# Patient Record
Sex: MALE | Race: BLACK OR AFRICAN AMERICAN | NOT HISPANIC OR LATINO | Employment: OTHER | URBAN - METROPOLITAN AREA
[De-identification: names, ages, dates, MRNs, and addresses within clinical notes are randomized per-mention and may not be internally consistent; named-entity substitution may affect disease eponyms.]

---

## 2018-02-16 ENCOUNTER — APPOINTMENT (EMERGENCY)
Dept: RADIOLOGY | Facility: HOSPITAL | Age: 33
DRG: 175 | End: 2018-02-16
Payer: MEDICARE

## 2018-02-16 ENCOUNTER — HOSPITAL ENCOUNTER (INPATIENT)
Facility: HOSPITAL | Age: 33
LOS: 1 days | Discharge: HOME | DRG: 175 | End: 2018-02-18
Attending: EMERGENCY MEDICINE | Admitting: INTERNAL MEDICINE
Payer: MEDICARE

## 2018-02-16 DIAGNOSIS — I26.09 OTHER PULMONARY EMBOLISM WITH ACUTE COR PULMONALE, UNSPECIFIED CHRONICITY (HCC): ICD-10-CM

## 2018-02-16 DIAGNOSIS — I26.99 BILATERAL PULMONARY EMBOLISM (HCC): Primary | ICD-10-CM

## 2018-02-16 PROBLEM — F12.10 MARIJUANA ABUSE: Status: ACTIVE | Noted: 2018-02-16

## 2018-02-16 PROBLEM — R07.9 CHEST PAIN: Status: ACTIVE | Noted: 2018-02-16

## 2018-02-16 PROBLEM — R06.02 SOB (SHORTNESS OF BREATH): Status: ACTIVE | Noted: 2018-02-16

## 2018-02-16 PROBLEM — I21.A1 NON-ST ELEVATION MYOCARDIAL INFARCTION (NSTEMI), TYPE 2: Status: ACTIVE | Noted: 2018-02-16

## 2018-02-16 LAB
ALBUMIN SERPL BCP-MCNC: 3.4 G/DL (ref 3.5–5)
ALP SERPL-CCNC: 65 U/L (ref 46–116)
ALT SERPL W P-5'-P-CCNC: 15 U/L (ref 12–78)
ANION GAP SERPL CALCULATED.3IONS-SCNC: 9 MMOL/L (ref 4–13)
APTT PPP: 24 SECONDS (ref 23–35)
AST SERPL W P-5'-P-CCNC: 9 U/L (ref 5–45)
ATRIAL RATE: 97 BPM
BASOPHILS # BLD AUTO: 0.03 THOUSANDS/ΜL (ref 0–0.1)
BASOPHILS NFR BLD AUTO: 0 % (ref 0–1)
BILIRUB SERPL-MCNC: 0.84 MG/DL (ref 0.2–1)
BUN SERPL-MCNC: 6 MG/DL (ref 5–25)
CALCIUM SERPL-MCNC: 8.7 MG/DL (ref 8.3–10.1)
CHLORIDE SERPL-SCNC: 106 MMOL/L (ref 100–108)
CO2 SERPL-SCNC: 27 MMOL/L (ref 21–32)
CREAT SERPL-MCNC: 1.21 MG/DL (ref 0.6–1.3)
EOSINOPHIL # BLD AUTO: 0.29 THOUSAND/ΜL (ref 0–0.61)
EOSINOPHIL NFR BLD AUTO: 4 % (ref 0–6)
ERYTHROCYTE [DISTWIDTH] IN BLOOD BY AUTOMATED COUNT: 14.7 % (ref 11.6–15.1)
GFR SERPL CREATININE-BSD FRML MDRD: 78 ML/MIN/1.73SQ M
GLUCOSE SERPL-MCNC: 105 MG/DL (ref 65–140)
HCT VFR BLD AUTO: 37.6 % (ref 36.5–49.3)
HGB BLD-MCNC: 12.5 G/DL (ref 12–17)
INR PPP: 1.11 (ref 0.86–1.16)
LYMPHOCYTES # BLD AUTO: 2.16 THOUSANDS/ΜL (ref 0.6–4.47)
LYMPHOCYTES NFR BLD AUTO: 28 % (ref 14–44)
MCH RBC QN AUTO: 25.9 PG (ref 26.8–34.3)
MCHC RBC AUTO-ENTMCNC: 33.2 G/DL (ref 31.4–37.4)
MCV RBC AUTO: 78 FL (ref 82–98)
MONOCYTES # BLD AUTO: 0.97 THOUSAND/ΜL (ref 0.17–1.22)
MONOCYTES NFR BLD AUTO: 13 % (ref 4–12)
NEUTROPHILS # BLD AUTO: 4.12 THOUSANDS/ΜL (ref 1.85–7.62)
NEUTS SEG NFR BLD AUTO: 55 % (ref 43–75)
NRBC BLD AUTO-RTO: 0 /100 WBCS
NT-PROBNP SERPL-MCNC: 2093 PG/ML
P AXIS: 76 DEGREES
PLATELET # BLD AUTO: 196 THOUSANDS/UL (ref 149–390)
PMV BLD AUTO: 9.5 FL (ref 8.9–12.7)
POTASSIUM SERPL-SCNC: 3.4 MMOL/L (ref 3.5–5.3)
PR INTERVAL: 164 MS
PROT SERPL-MCNC: 7.8 G/DL (ref 6.4–8.2)
PROTHROMBIN TIME: 14.3 SECONDS (ref 12.1–14.4)
QRS AXIS: 74 DEGREES
QRSD INTERVAL: 90 MS
QT INTERVAL: 352 MS
QTC INTERVAL: 447 MS
RBC # BLD AUTO: 4.83 MILLION/UL (ref 3.88–5.62)
SODIUM SERPL-SCNC: 142 MMOL/L (ref 136–145)
T WAVE AXIS: 30 DEGREES
TROPONIN I SERPL-MCNC: 0.19 NG/ML
VENTRICULAR RATE: 97 BPM
WBC # BLD AUTO: 7.6 THOUSAND/UL (ref 4.31–10.16)

## 2018-02-16 PROCEDURE — 93005 ELECTROCARDIOGRAM TRACING: CPT

## 2018-02-16 PROCEDURE — 71275 CT ANGIOGRAPHY CHEST: CPT

## 2018-02-16 PROCEDURE — 93010 ELECTROCARDIOGRAM REPORT: CPT | Performed by: INTERNAL MEDICINE

## 2018-02-16 PROCEDURE — 83880 ASSAY OF NATRIURETIC PEPTIDE: CPT | Performed by: EMERGENCY MEDICINE

## 2018-02-16 PROCEDURE — 85610 PROTHROMBIN TIME: CPT | Performed by: EMERGENCY MEDICINE

## 2018-02-16 PROCEDURE — 36415 COLL VENOUS BLD VENIPUNCTURE: CPT | Performed by: EMERGENCY MEDICINE

## 2018-02-16 PROCEDURE — 80053 COMPREHEN METABOLIC PANEL: CPT | Performed by: EMERGENCY MEDICINE

## 2018-02-16 PROCEDURE — 36415 COLL VENOUS BLD VENIPUNCTURE: CPT

## 2018-02-16 PROCEDURE — 85025 COMPLETE CBC W/AUTO DIFF WBC: CPT | Performed by: EMERGENCY MEDICINE

## 2018-02-16 PROCEDURE — 84484 ASSAY OF TROPONIN QUANT: CPT | Performed by: EMERGENCY MEDICINE

## 2018-02-16 PROCEDURE — 85730 THROMBOPLASTIN TIME PARTIAL: CPT | Performed by: EMERGENCY MEDICINE

## 2018-02-16 PROCEDURE — 99285 EMERGENCY DEPT VISIT HI MDM: CPT

## 2018-02-16 RX ORDER — HEPARIN SODIUM 10000 [USP'U]/100ML
3-30 INJECTION, SOLUTION INTRAVENOUS
Status: DISCONTINUED | OUTPATIENT
Start: 2018-02-16 | End: 2018-02-18 | Stop reason: HOSPADM

## 2018-02-16 RX ORDER — HEPARIN SODIUM 1000 [USP'U]/ML
7600 INJECTION, SOLUTION INTRAVENOUS; SUBCUTANEOUS ONCE
Status: COMPLETED | OUTPATIENT
Start: 2018-02-16 | End: 2018-02-16

## 2018-02-16 RX ORDER — HEPARIN SODIUM 1000 [USP'U]/ML
3800 INJECTION, SOLUTION INTRAVENOUS; SUBCUTANEOUS AS NEEDED
Status: DISCONTINUED | OUTPATIENT
Start: 2018-02-16 | End: 2018-02-18 | Stop reason: HOSPADM

## 2018-02-16 RX ORDER — HEPARIN SODIUM 1000 [USP'U]/ML
7600 INJECTION, SOLUTION INTRAVENOUS; SUBCUTANEOUS AS NEEDED
Status: DISCONTINUED | OUTPATIENT
Start: 2018-02-16 | End: 2018-02-18 | Stop reason: HOSPADM

## 2018-02-16 RX ORDER — ACETAMINOPHEN 325 MG/1
650 TABLET ORAL EVERY 6 HOURS PRN
Status: DISCONTINUED | OUTPATIENT
Start: 2018-02-16 | End: 2018-02-18 | Stop reason: HOSPADM

## 2018-02-16 RX ORDER — POTASSIUM CHLORIDE 20 MEQ/1
40 TABLET, EXTENDED RELEASE ORAL ONCE
Status: COMPLETED | OUTPATIENT
Start: 2018-02-16 | End: 2018-02-16

## 2018-02-16 RX ADMIN — HEPARIN SODIUM 18 UNITS/KG/HR: 10000 INJECTION, SOLUTION INTRAVENOUS at 20:30

## 2018-02-16 RX ADMIN — IOHEXOL 100 ML: 350 INJECTION, SOLUTION INTRAVENOUS at 20:25

## 2018-02-16 RX ADMIN — POTASSIUM CHLORIDE 40 MEQ: 1500 TABLET, EXTENDED RELEASE ORAL at 23:30

## 2018-02-16 RX ADMIN — HEPARIN SODIUM 7600 UNITS: 1000 INJECTION, SOLUTION INTRAVENOUS; SUBCUTANEOUS at 20:30

## 2018-02-16 NOTE — ED ATTENDING ATTESTATION
Sarah Kimball DO, saw and evaluated the patient  I have discussed the patient with the resident/non-physician practitioner and agree with the resident's/non-physician practitioner's findings, Plan of Care, and MDM as documented in the resident's/non-physician practitioner's note, except where noted  All available labs and Radiology studies were reviewed  At this point I agree with the current assessment done in the Emergency Department  I have conducted an independent evaluation of this patient a history and physical is as follows:    33 yo male presents for evaluation of chest pain starting acutely yesterday, progressively worse  Symptoms worse with exertion  Associated with dyspnea, LLE swelling and pain  Pt recently flew here from [place] last week  He has hx of DVT/PE, and was treated for that  Currently not anticoagulated  Pain rated 5/10, constant, non radiating  Imp: chest pain, dyspnea, hx of DVT  Concern for recurrent PE plan: cardiac eval, CT chest PE study  Reassess        Critical Care Time  CritCare Time    Procedures

## 2018-02-16 NOTE — ED PROVIDER NOTES
History  Chief Complaint   Patient presents with    Chest Pain     Pt has been having SOB and chest pain since last night  Pt has hx of blood clots and has not been on any blood thinners  HPI  77-year-old male with history of unprovoked DVT and PE, not currently on anticoagulation, presents to the emergency department with complaints of chest pain and shortness of breath  Patient states that he developed pressure-like chest pain yesterday and that it has been progressive since onset  Pain has been worse with exertion, better with rest   He also complains shortness of breath and left calf pain/swelling  He recalls having had similar symptoms a few years ago when he was diagnosed with pulmonary embolisms, for which he underwent catheter directed tPA lysis  He states that he was previously on Xarelto, but that he discontinued it after a few months and never received follow-up  He denies any known family history of the the VTE or know clotting disorder  He denies any recent trauma or immobility  He flew here from [place] last week (2 hour flight)  On review of systems, he denies any complaints other than stated above  None       Past Medical History:   Diagnosis Date    DVT (deep venous thrombosis) ([facility] [address] )        History reviewed  No pertinent surgical history  Family History   Problem Relation Age of Onset    No Known Problems Mother     No Known Problems Father      I have reviewed and agree with the history as documented  Social History   Substance Use Topics    Smoking status: Current Some Day Smoker     Types: Cigarettes    Smokeless tobacco: Current User    Alcohol use Yes        Review of Systems   Constitutional: Negative for chills and fever  Respiratory: Positive for shortness of breath  Cardiovascular: Positive for chest pain and leg swelling  Gastrointestinal: Negative for abdominal pain, nausea and vomiting  Musculoskeletal: Negative for back pain     Skin: Negative for rash and wound  Allergic/Immunologic: Negative for immunocompromised state  Neurological: Negative for headaches  Hematological: Does not bruise/bleed easily  Psychiatric/Behavioral: The patient is not nervous/anxious  All other systems reviewed and are negative  Physical Exam  ED Triage Vitals [02/16/18 1701]   Temperature Pulse Respirations Blood Pressure SpO2   98 6 °F (37 °C) (!) 110 20 138/71 94 %      Temp Source Heart Rate Source Patient Position - Orthostatic VS BP Location FiO2 (%)   Oral Monitor Sitting Left arm --      Pain Score       5           Orthostatic Vital Signs  Vitals:    02/16/18 2130 02/16/18 2200 02/16/18 2230 02/16/18 2324   BP:   134/78 148/67   Pulse: 95 96 93 87   Patient Position - Orthostatic VS:    Sitting       Physical Exam   Constitutional: He is oriented to person, place, and time  He appears well-nourished  No distress  HENT:   Head: Normocephalic and atraumatic  Eyes: EOM are normal    Neck: Normal range of motion  Neck supple  Cardiovascular: Regular rhythm  Tachycardia present  Pulmonary/Chest: Effort normal and breath sounds normal  No respiratory distress  Abdominal: Soft  He exhibits no distension  There is no tenderness  Musculoskeletal: Normal range of motion  Left lower leg: He exhibits tenderness  Neurological: He is alert and oriented to person, place, and time  Skin: Skin is warm and dry  He is not diaphoretic  Psychiatric: He has a normal mood and affect  His behavior is normal    Nursing note and vitals reviewed        ED Medications  Medications   heparin (porcine) 25,000 units in 250 mL infusion (premix) (18 Units/kg/hr × 95 kg (Order-Specific) Intravenous Rate/Dose Change 2/16/18 2228)   heparin (porcine) injection 7,600 Units (not administered)   heparin (porcine) injection 3,800 Units (not administered)   acetaminophen (TYLENOL) tablet 650 mg (not administered)   heparin (porcine) injection 7,600 Units (7,600 Units Intravenous Given 2/16/18 2030)   iohexol (OMNIPAQUE) 350 MG/ML injection (MULTI-DOSE) 100 mL (100 mL Intravenous Given 2/16/18 2025)   potassium chloride (K-DUR,KLOR-CON) CR tablet 40 mEq (40 mEq Oral Given 2/16/18 2330)       Diagnostic Studies  Results Reviewed     Procedure Component Value Units Date/Time    BNP [[id number]]  (Abnormal) Collected:  02/16/18 1722    Lab Status:  Final result Specimen:  Blood from Arm, Left Updated:  02/16/18 2045     NT-proBNP 2,093 (H) pg/mL     APTT [[id number]]  (Normal) Collected:  02/16/18 1945    Lab Status:  Final result Specimen:  Blood from Arm, Left Updated:  02/16/18 2015     PTT 24 seconds     Narrative: Therapeutic Heparin Range = 60-90 seconds    Protime-INR [[id number]]  (Normal) Collected:  02/16/18 1945    Lab Status:  Final result Specimen:  Blood from Arm, Left Updated:  02/16/18 2015     Protime 14 3 seconds      INR 1 11    Troponin I [[id number]]  (Abnormal) Collected:  02/16/18 1722    Lab Status:  Final result Specimen:  Blood from Arm, Left Updated:  02/16/18 1751     Troponin I 0 19 (H) ng/mL     Narrative:         [employer] Chemistry analyzer 99% cutoff is > 0 04 ng/mL in network labs    o cTnI 99% cutoff is useful only when applied to patients in the clinical setting of myocardial ischemia  o cTnI 99% cutoff should be interpreted in the context of clinical history, ECG findings and possibly cardiac imaging to establish correct diagnosis  o cTnI 99% cutoff may be suggestive but clearly not indicative of a coronary event without the clinical setting of myocardial ischemia      Comprehensive metabolic panel [[id number]]  (Abnormal) Collected:  02/16/18 1722    Lab Status:  Final result Specimen:  Blood from Arm, Left Updated:  02/16/18 1749     Sodium 142 mmol/L      Potassium 3 4 (L) mmol/L      Chloride 106 mmol/L      CO2 27 mmol/L      Anion Gap 9 mmol/L      BUN 6 mg/dL      Creatinine 1 21 mg/dL      Glucose 105 mg/dL      Calcium 8 7 mg/dL      AST 9 U/L      ALT 15 U/L      Alkaline Phosphatase 65 U/L      Total Protein 7 8 g/dL      Albumin 3 4 (L) g/dL      Total Bilirubin 0 84 mg/dL      eGFR 78 ml/min/1 73sq m     Narrative:         National Kidney Disease Education Program recommendations are as follows:  GFR calculation is accurate only with a steady state creatinine  Chronic Kidney disease less than 60 ml/min/1 73 sq  meters  Kidney failure less than 15 ml/min/1 73 sq  meters  CBC and differential [[id number]]  (Abnormal) Collected:  02/16/18 1722    Lab Status:  Final result Specimen:  Blood from Arm, Left Updated:  02/16/18 1735     WBC 7 60 Thousand/uL      RBC 4 83 Million/uL      Hemoglobin 12 5 g/dL      Hematocrit 37 6 %      MCV 78 (L) fL      MCH 25 9 (L) pg      MCHC 33 2 g/dL      RDW 14 7 %      MPV 9 5 fL      Platelets 760 Thousands/uL      nRBC 0 /100 WBCs      Neutrophils Relative 55 %      Lymphocytes Relative 28 %      Monocytes Relative 13 (H) %      Eosinophils Relative 4 %      Basophils Relative 0 %      Neutrophils Absolute 4 12 Thousands/µL      Lymphocytes Absolute 2 16 Thousands/µL      Monocytes Absolute 0 97 Thousand/µL      Eosinophils Absolute 0 29 Thousand/µL      Basophils Absolute 0 03 Thousands/µL                  CTA ED chest PE study   Final Result by Shira Craft MD (02/16 2056)      Numerous bilateral acute pulmonary emboli  The calculated ratio of right ventricular to left ventricular diameter (RV/LV ratio) is 2  This is greater than 0 9, which is abnormal and indicates right heart strain  An abnormal RV/LV ratio has been shown to be associated with an increased risk of 30    day mortality in the setting of acute pulmonary embolism  I personally discussed this study with Orly Washington on 2/16/2018 at 8:54 PM                Workstation performed: [id number]               Procedures  Procedures  EKG: NSR @ 97 BPM  Right heart strain pattern  [id number] noted       Phone Consults  ED Phone Contact    ED Course  ED Course as of Feb 17 0022 Fri Feb 16, 2018   1819 Troponin Krishna Davies   9326 [id number]    2023 SOD paged for admission    2049 NT-proBNP: (!) 2,093                               MDM  Number of Diagnoses or Management Options  Diagnosis management comments: 26-year-old male with history of PE presenting with chest pain, dyspnea, and left calf pain  Very high risk for he  Will obtain cardiac workup, the study, likely began heparin  Plan for admission  CritCare Time    Disposition  Final diagnoses:   Bilateral pulmonary embolism ([address] )     Time reflects when diagnosis was documented in both MDM as applicable and the Disposition within this note     Time User Action Codes Description Comment    2/17/2018 12:22 AM Janece High Add [I26 99] Bilateral pulmonary embolism [facility])       ED Disposition     ED Disposition Condition Comment    Admit  Case was discussed with SOD and the patient's admission status was agreed to be Admission Status: observation status to the service of Dr Sasha Persaud   Follow-up Information    None       There are no discharge medications for this patient  No discharge procedures on file  ED Provider  Attending physically available and evaluated Valerie Cardenas I managed the patient along with the ED Attending      Electronically Signed by         Roby Bernabe MD  02/17/18 0283

## 2018-02-17 PROBLEM — E87.6 HYPOKALEMIA: Status: ACTIVE | Noted: 2018-02-17

## 2018-02-17 PROBLEM — R07.9 CHEST PAIN: Status: RESOLVED | Noted: 2018-02-16 | Resolved: 2018-02-17

## 2018-02-17 LAB
ANION GAP SERPL CALCULATED.3IONS-SCNC: 7 MMOL/L (ref 4–13)
APTT PPP: 51 SECONDS (ref 23–35)
APTT PPP: 72 SECONDS (ref 23–35)
APTT PPP: 95 SECONDS (ref 23–35)
BUN SERPL-MCNC: 8 MG/DL (ref 5–25)
CALCIUM SERPL-MCNC: 8.5 MG/DL (ref 8.3–10.1)
CHLORIDE SERPL-SCNC: 106 MMOL/L (ref 100–108)
CO2 SERPL-SCNC: 26 MMOL/L (ref 21–32)
CREAT SERPL-MCNC: 1 MG/DL (ref 0.6–1.3)
ERYTHROCYTE [DISTWIDTH] IN BLOOD BY AUTOMATED COUNT: 14.5 % (ref 11.6–15.1)
GFR SERPL CREATININE-BSD FRML MDRD: 114 ML/MIN/1.73SQ M
GLUCOSE P FAST SERPL-MCNC: 98 MG/DL (ref 65–99)
GLUCOSE SERPL-MCNC: 98 MG/DL (ref 65–140)
HCT VFR BLD AUTO: 35.4 % (ref 36.5–49.3)
HGB BLD-MCNC: 11.6 G/DL (ref 12–17)
MCH RBC QN AUTO: 25.3 PG (ref 26.8–34.3)
MCHC RBC AUTO-ENTMCNC: 32.8 G/DL (ref 31.4–37.4)
MCV RBC AUTO: 77 FL (ref 82–98)
NT-PROBNP SERPL-MCNC: 2042 PG/ML
PLATELET # BLD AUTO: 204 THOUSANDS/UL (ref 149–390)
PMV BLD AUTO: 9.4 FL (ref 8.9–12.7)
POTASSIUM SERPL-SCNC: 3.9 MMOL/L (ref 3.5–5.3)
RBC # BLD AUTO: 4.58 MILLION/UL (ref 3.88–5.62)
SODIUM SERPL-SCNC: 139 MMOL/L (ref 136–145)
TROPONIN I SERPL-MCNC: 0.13 NG/ML
WBC # BLD AUTO: 7 THOUSAND/UL (ref 4.31–10.16)

## 2018-02-17 PROCEDURE — 85027 COMPLETE CBC AUTOMATED: CPT | Performed by: INTERNAL MEDICINE

## 2018-02-17 PROCEDURE — 80048 BASIC METABOLIC PNL TOTAL CA: CPT | Performed by: INTERNAL MEDICINE

## 2018-02-17 PROCEDURE — 85730 THROMBOPLASTIN TIME PARTIAL: CPT | Performed by: INTERNAL MEDICINE

## 2018-02-17 PROCEDURE — 84484 ASSAY OF TROPONIN QUANT: CPT | Performed by: INTERNAL MEDICINE

## 2018-02-17 PROCEDURE — 83880 ASSAY OF NATRIURETIC PEPTIDE: CPT | Performed by: INTERNAL MEDICINE

## 2018-02-17 RX ADMIN — HEPARIN SODIUM 16 UNITS/KG/HR: 10000 INJECTION, SOLUTION INTRAVENOUS at 12:41

## 2018-02-17 RX ADMIN — HEPARIN SODIUM 3800 UNITS: 1000 INJECTION, SOLUTION INTRAVENOUS; SUBCUTANEOUS at 18:58

## 2018-02-17 NOTE — PROGRESS NOTES
Pt admit to unit, denies current pain, heparin gtt running, given snacks as requesting, resting at this time

## 2018-02-17 NOTE — SOCIAL WORK
CM met with pt and pt aware cm role at discharge   Pt lives in a house with his family   Prior to admission pt was independent all ADL" s  Pt denies and DME's , VNA or SNF   Pt denies any mental health or [address] [place] [address] rehab in the past  Pt does have depression   Pt does not have  pharmacy   Pt educated on [facility] When medically clear family will transport home   CM will follow for home anticoagulation medication   Alice Bangura CM reviewed d/c planning process including the following: identifying help at home, patient preference for d/c planning needs, Discharge Lounge, [facility] Meds to Bed program, availability of treatment team to discuss questions or concerns patient and/or family may have regarding understanding medications and recognizing signs and symptoms once discharged  CM also encouraged patient to follow up with all recommended appointments after discharge  Patient advised of importance for patient and family to participate in managing patients medical well being

## 2018-02-17 NOTE — H&P
INTERNAL MEDICINE HISTORY AND PHYSICAL  ED 22 SOD Team B     NAME: Xochitl Ambrosio  AGE: 34 y o  SEX: male  : 1985   MRN: [id number]  ENCOUNTER: [id number]    DATE: 2018  TIME: 8:50 PM    Primary Care Physician: No primary care provider on file  Admitting Provider: Lisa Bojorquez MD    Chief complaint:  Chest pain    History of Present Illness     Xochitl Ambrosio is a 34 y o  black male with past medical history significant for DVT with PE diagnosed 3 years ago treated with xarelto for several months, patient stopped taking it on his own prematurely before the recommended 6 month time frame  He presents for chest pain that started earlier this evening that is mild-to-moderate in severity, worse with exertion, and resolved currently  Also noted is some mild dyspnea which is also resolved at this time  He also reports mild left calf pain which is the lower extremity with prior DVT  In the emergency department patient had a CT angio PE study was found to have bilateral subsegmental pulmonary embolisms on wet read  He denies any history of blood clots in the family  He did have a recent trip from [place] earlier this week which was a 2 hour flight but no other travel  No history of cancer  No history of recent surgery  He does note that he thinks he was treated with mechanical thrombectomy for his PE that occurred 3 years ago in association with his DVT  Review of Systems   Review of Systems   Constitutional: Negative for chills and fever  HENT: Negative for rhinorrhea and sinus pain  Eyes: Negative for discharge and visual disturbance  Respiratory: Positive for shortness of breath  Negative for cough  Cardiovascular: Positive for chest pain  Negative for palpitations and leg swelling  Gastrointestinal: Negative for abdominal pain, nausea and vomiting  Genitourinary: Negative  Negative for difficulty urinating and dysuria  Musculoskeletal: Negative    Negative for back pain, joint swelling and myalgias  Neurological: Negative  Past Medical History     Past Medical History:   Diagnosis Date    DVT (deep venous thrombosis) (HCC)        Past Surgical History   History reviewed  No pertinent surgical history  Social History     History   Alcohol Use    Yes     History   Drug Use    Types: Marijuana     History   Smoking Status    Current Some Day Smoker    Types: Cigarettes   Smokeless Tobacco    Current User       Family History     Family History   Problem Relation Age of Onset    No Known Problems Mother     No Known Problems Father        Medications Prior to Admission     Prior to Admission medications    Not on File       Allergies   No Known Allergies    Objective     Vitals:    02/16/18 1900 02/16/18 1915 02/16/18 1930 02/16/18 1945   BP:       BP Location:       Pulse: 95 98 96 97   Resp: (!) 28 (!) 31 20 22   Temp:       TempSrc:       SpO2: 96% 96% 96% 94%   Weight:       Height:         Body mass index is 33 45 kg/m²  No intake or output data in the 24 hours ending 02/16/18 2050  Invasive Devices     Peripheral Intravenous Line            Peripheral IV 02/16/18 Left Antecubital less than 1 day    Peripheral IV 02/16/18 Left Forearm less than 1 day                Physical Exam  GENERAL: Appears well-developed and well-nourished  Appears in no acute distress   HEENT: Normocephalic and atraumatic  No scleral icterus  Eyes with glassy appearance   NECK: Neck supple, Trachea midline  No JVD appreciated, no masses   CARDIOVASCULAR: Regular rate rhythm without murmur, S3 gallop present, no heave   RESPIRATORY: CTA B/L, no rales, rhonci or wheezes  Normal respiratory expansion  No increased effort   ABDOMINAL: Bowel sounds noted, non-tender, soft, non-distended  No hepatosplenomegaly   EXTREMITIES: 2+ DP  pulses bilaterally; no cyanosis, clubbing, edema   No lower extremity swelling appreciated, there is scant left lower extremity calf tenderness, right calf nontender   MUSCULOSKELETAL: No joint tenderness, deformity or swelling,    NEUROLOGIC: Awake and alert,  No sensory or motor deficits  CN 2-12 intact  Speech fluent  SKIN: Skin is warm and dry  No skin lesions are present  No rashes  PSYCHIATRIC: Normal mood and affect     Lab Results: I have personally reviewed pertinent reports  Imaging: I have personally reviewed pertinent films in PACS  CT angio PE study sig for PULMONARY ARTERIAL TREE:  There are numerous bilateral acute pulmonary emboli, the largest on the right side is in the right interlobar pulmonary artery  Largest on the left are multiple segmental size left lower lobe emboli  RV/LV ratio is 2  EKG, Pathology, and Other Studies: I have personally reviewed pertinent reports  EKG reviewed and reveals normal sinus rhythm with nonspecific ST-T-wave abnormality, normal axis    Medications given in Emergency Department     Medication Administration - last 24 hours from 02/15/2018 2050 to 02/16/2018 2050       Date/Time Order Dose Route Action Action by     02/16/2018 2030 heparin (porcine) injection 7,600 Units 7,600 Units Intravenous Given Alina Carballo RN     02/16/2018 2030 heparin (porcine) 25,000 units in 250 mL infusion (premix) 18 Units/kg/hr Intravenous [address] Servando Alvarado RN     02/16/2018 2025 iohexol (OMNIPAQUE) 350 MG/ML injection (MULTI-DOSE) 100 mL 100 mL Intravenous Given Erica oSler          Assessment and Plan     Problem List     * (Principal)Pulmonary embolism (HCC)    Chest pain    SOB (shortness of breath)    Marijuana abuse        Bilateral pulmonary embolism with right ventricular strain  2nd recurrence, unprovoked  No hemodynamic instability  Treat with heparin drip and plan to switch to oral anticoagulation with consideration to compliance and affordability  Echo in the ED reveals normal EF, right ventricle mild to moderately dilated with tricuspid regurg, unclear if acute versus chronic    BNP is elevated at 2093, this may be from the RV strain, however clinically patient does not appear as fluid overloaded nor short of breath at this time  Final read CT angio reveals and RV/LV ratio up to which is greater than 0 9 indicating abnormality suggestive of the above  If patient acutely decompensates will treat aggressively with thrombolysis  NSTEMI type 2 secondary to the above  Troponin mildly elevated in the emergency department at 0 19, will trend  Likely secondary to right ventricular strain which is evident on echo and S3 gallop on physical exam   Treat for underlying cause which includes heparin drip    Marijuana use  Current, counseling for cessation  Code Status: Level 1 - Full Code  VTE Pharmacologic Prophylaxis: Heparin   VTE Mechanical Prophylaxis: sequential compression device  Admission Status: OBSERVATION    Admission Time  I spent 45 minutes admitting the patient  This involved direct patient contact where I performed a full history and physical, reviewing previous records, and reviewing laboratory and other diagnostic studies      Juan Pablo Grady DO  Internal Medicine  PGY-1

## 2018-02-17 NOTE — CASE MANAGEMENT
Initial Clinical Review    Admission: Date/Time/Statement: 02/16/18 @ 2030 -- OBS -- changed to INPATIENT 2/17 @ 0948     Start   Ordered   02/17/18 0948  Inpatient Admission Once     Transfer Service: General Medicine       Question Answer Comment   Admitting Physician Tyler Conde    Level of Care Med Surg    Estimated length of stay More than 2 Midnights    Certification I certify that inpatient services are medically necessary for this patient for a duration of greater than two midnights  See H&P and MD Progress Notes for additional information about the patient's course of treatment  02/17/18 0947       Orders Placed This Encounter   Procedures    Place in Observation (expected length of stay for this patient is less than two midnights)     Standing Status:   Standing     Number of Occurrences:   1     Order Specific Question:   Admitting Physician     Answer:   Tyler Conde [[id number]]     Order Specific Question:   Level of Care     Answer:   Med Surg [16]       ED: Date/Time/Mode of Arrival:   ED Arrival Information     Expected Arrival Acuity Means of Arrival Escorted By Service Admission Type    - 2/16/2018 16:55 Emergent Walk-In Self General Medicine Emergency    Arrival Complaint    Chest Pain          Chief Complaint:   Chief Complaint   Patient presents with    Chest Pain     Pt has been having SOB and chest pain since last night  Pt has hx of blood clots and has not been on any blood thinners  History of Illness: 34 y o  black male with past medical history significant for DVT with PE diagnosed 3 years ago treated with xarelto for several months, patient stopped taking it on his own prematurely before the recommended 6 month time frame  He presents for chest pain that started earlier this evening that is mild-to-moderate in severity, worse with exertion, and resolved currently  Also noted is some mild dyspnea which is also resolved at this time    He also reports mild left calf pain which is the lower extremity with prior DVT  He denies any history of blood clots in the family  He did have a recent trip from [place] earlier this week which was a 2 hour flight but no other travel  No history of cancer  No history of recent surgery  He does note that he thinks he was treated with mechanical thrombectomy for his PE that occurred 3 years ago in association with his DVT  ED Vital Signs:   ED Triage Vitals [02/16/18 1701]   Temperature Pulse Respirations Blood Pressure SpO2   98 6 °F (37 °C) (!) 110 20 138/71 94 %      Temp Source Heart Rate Source Patient Position - Orthostatic VS BP Location FiO2 (%)   Oral Monitor Sitting Left arm --      Pain Score       5        Wt Readings from Last 1 Encounters:   02/16/18 99 8 kg (220 lb)       Vital Signs:  02/16 0701  02/17 0700 02/17 0701  02/17 0910  Most Recent     Temperature (°F) 98-99 4 99 7  99 7 (37 6)    Pulse  96  96    Respirations 18-31 18  18    Blood Pressure 131//67 117/63  117/63    SpO2 (%) 92-97 96  96        Abnormal Labs/Diagnostic Test Results: K 3 4, Albumin 3 4, Trop 0 19, BNP 2093  CT angio PE study sig for PULMONARY ARTERIAL TREE: Sharonda Mckenna are numerous bilateral acute pulmonary emboli, the largest on the right side is in the right interlobar pulmonary artery   Largest on the left are multiple segmental size left lower lobe emboli  RV/LV ratio is 2      ED Treatment:   Medication Administration from 02/16/2018 1655 to 02/16/2018 2213       Date/Time Order Dose Route Action Action by Comments     02/16/2018 1845 heparin (VTE/PE) high   Intravenous Handoff David Duarte RN      02/16/2018 2030 heparin (porcine) injection 7,600 Units 7,600 Units Intravenous Given Yinka Livingston RN NEEDED PTT RESULTS     02/16/2018 2030 heparin (porcine) 25,000 units in 250 mL infusion (premix) 18 Units/kg/hr Intravenous [address] Doris Shelia Alvarado RN      02/16/2018 2025 iohexol (OMNIPAQUE) 350 MG/ML injection (MULTI-DOSE) 100 mL 100 mL Intravenous Given Mariah Corrales           Past Medical/Surgical History:   Past Medical History:   Diagnosis Date    DVT (deep venous thrombosis) ([facility] [address] )        Admitting Diagnosis: Chest pain [R07 9]    Age/Sex: 34 y o  male    Assessment/Plan:   Problem List      * (Principal)Pulmonary embolism (HCC)     Chest pain     SOB (shortness of breath)     Marijuana abuse          Bilateral pulmonary embolism with right ventricular strain  2nd recurrence, unprovoked  No hemodynamic instability  Treat with heparin drip and plan to switch to oral anticoagulation with consideration to compliance and affordability  Echo in the ED reveals normal EF, right ventricle mild to moderately dilated with tricuspid regurg, unclear if acute versus chronic  BNP is elevated at 2093, this may be from the RV strain, however clinically patient does not appear as fluid overloaded nor short of breath at this time  Final read CT angio reveals and RV/LV ratio up to which is greater than 0 9 indicating abnormality suggestive of the above  If patient acutely decompensates will treat aggressively with thrombolysis      NSTEMI type 2 secondary to the above  Troponin mildly elevated in the emergency department at 0 19, will trend  Likely secondary to right ventricular strain which is evident on echo and S3 gallop on physical exam   Treat for underlying cause which includes heparin drip     Marijuana use  Current, counseling for cessation        Admission Orders:  M/S/Tele unit  Telem  Serial troponins  Echo  SCD's B/L   Up & oob as tolerated  Regular diet    Scheduled Meds:   Current Facility-Administered Medications:  acetaminophen 650 mg Oral Q6H PRN Elio Ivan MD    heparin (porcine) 3-30 Units/kg/hr (Order-Specific) Intravenous Titrated Alea Carrillo MD Last Rate: 16 Units/kg/hr (02/17/18 0300)   heparin (porcine) 3,800 Units Intravenous PRN Alea Carrillo MD    heparin (porcine) 7,600 Units Intravenous PRJUAN DIEGO Carrillo MD Continuous Infusions:   heparin (porcine) 3-30 Units/kg/hr (Order-Specific) Last Rate: 16 Units/kg/hr (02/17/18 0300)     PRN Meds:   acetaminophen    heparin (porcine)      2/17 --  Assessment/Plan:     Principal Problem:    Pulmonary embolism (HCC)  Active Problems:    SOB (shortness of breath)    Marijuana abuse    Non-ST elevation myocardial infarction (NSTEMI), type 2 (HCC)    Hypokalemia      1  Bilateral acute subsegmental pulmonary emboli with evidence of RV strain  · Patient hemodynamically stable  RV/LV ratio equals 2 which indicates possible right heart strain which puts him at increased risk of 30 mortality in the setting of acute PE  Will follow up with 2D echocardiogram to assess RV function  · Continue heparin drip, will discuss with case management regarding transition to NOAC pending insurance approval  · Continue telemetry monitoring  · Monitor hemodynamics     2  NSTEMI type 2  · Likely in the setting of RV strain with #1  No concerning ST or T-wave changes on EKG  Troponins peaked at 0 19 at our currently 0 13, will not trend any further  · Continue to monitor     3  Hypokalemia  · Replete  Continue to monitor  Repeat BMP in a m           Disposition:  Continue medical management as outlined above    Patient will likely be transitioned from heparin drip to NOAC pending insurance approval   Will discuss with case management

## 2018-02-17 NOTE — PROGRESS NOTES
[facility] Senior Admission Note   Unit/Bed # @DBLINK (JANET,41635)@ Encounter: [id number]  SOD Team Terrance Ibanez 34 y o  male [id number]       Patient seen and examined  Reviewed H&P per Dr Prema Carcamo  Agree with the assessment and plan  27-year-old male presents with acute pulmonary embolism  Patient has a history of pulmonary embolism approximately 4 years ago  It was unprovoked 1st episode of pulmonary embolism and he was advised taking anti coagulant medication for the next 6 months  Patient admits to taking medication only for about 4-5 months and then he abruptly stopped  He comes to the ED with a complaint of chest pain and shortness of breath that started yesterday  He also complains from a left calf pain  Currently patient denies any family history of thrombosis or blood disorders, any recent long flight, any current smoking  He currently admits to smoking marijuana not tobacco   He previously smoked about 2-3 cigarettes a day  Two weeks ago he came from from [place], the flight lasted 2 hours  Assessment/Plan: Principal Problem:    Pulmonary embolism (HCC)  Active Problems:    Chest pain    SOB (shortness of breath)    Marijuana abuse    Non-ST elevation myocardial infarction (NSTEMI), type 2 (HCC)     Acute pulmonary embolism - CTA chest PE study - Numerous bilateral acute pulmonary emboli with right heart strain   An abnormal RV/LV ratio has been shown to be associated with an increased risk of 30 day mortality in the setting of acute pulmonary embolism    - patient was started on heparin drip  - continue heparin drip and then transitioned patient to warfarin or Xarelto/Eliquis  - patient will need outpatient hematology follow-up and anticoagulation workup including factor V leiden mutation, antithrombin, prothrombin gene mutation    Non STEMI type 2 - Elevation of troponins, troponin 0 19 in the setting acute PE    Marijuana abuse - patient was counseled on the importance of marijuana cessation    Disposition:  OBSERVATION    Expected LOS: <2 Citlaly Gonzalez MD

## 2018-02-17 NOTE — PROGRESS NOTES
IM Residency Progress Note   Unit/Bed#: CW2 216-02 Encounter: [id number]  SOD Team B       Tony Face 34 y o  male [id number]    Hospital Stay Days: 0      Assessment/Plan:    Principal Problem:    Pulmonary embolism ([address] )  Active Problems:    SOB (shortness of breath)    Marijuana abuse    Non-ST elevation myocardial infarction (NSTEMI), type 2 (HCC)    Hypokalemia      1  Bilateral acute subsegmental pulmonary emboli with evidence of RV strain  · Patient hemodynamically stable  RV/LV ratio equals 2 which indicates possible right heart strain which puts him at increased risk of 30 mortality in the setting of acute PE  Will follow up with 2D echocardiogram to assess RV function  · Continue heparin drip, will discuss with case management regarding transition to NOAC pending insurance approval  · Continue telemetry monitoring  · Monitor hemodynamics    2  NSTEMI type 2  · Likely in the setting of RV strain with #1  No concerning ST or T-wave changes on EKG  Troponins peaked at 0 19 at our currently 0 13, will not trend any further  · Continue to monitor    3  Hypokalemia  · Replete  Continue to monitor  Repeat BMP in a m  Disposition:  Continue medical management as outlined above  Patient will likely be transitioned from heparin drip to NOAC pending insurance approval   Will discuss with case management      Subjective:   Patient seen and examined this morning  No acute events overnight  Hemodynamically stable  Currently denies any chest pain  Saturating well on room air, though does have some exertional dyspnea    Denies any nausea, vomiting, fevers or diarrhea       Vitals: Temp (24hrs), Av 8 °F (37 1 °C), Min:98 °F (36 7 °C), Max:99 7 °F (37 6 °C)  Current: Temperature: 99 7 °F (37 6 °C)  Vitals:    18 2230 18 2324 18 0319 18 0731   BP: 134/78 148/67 144/70 117/63   BP Location:  Left arm     Pulse: 93 87 80 96   Resp:    Temp: 98 4 °F (36 9 °C) 99 4 °F (37 4 °C) 98 °F (36 7 °C) 99 7 °F (37 6 °C)   TempSrc: Oral Oral  Oral   SpO2: 93% 92% 93% 96%   Weight:       Height:        Body mass index is 33 45 kg/m²  I/O last 24 hours: In: 810 9 [P O :710; I V :100 9]  Out: 400 [Urine:400]      Physical Exam:   Physical Exam   Constitutional: He is oriented to person, place, and time  He appears well-developed and well-nourished  HENT:   Head: Normocephalic and atraumatic  Eyes: Pupils are equal, round, and reactive to light  Neck: Normal range of motion  Cardiovascular: Normal rate and regular rhythm  Exam reveals no gallop and no friction rub  No murmur heard  Pulmonary/Chest: Effort normal and breath sounds normal    Abdominal: Soft  Bowel sounds are normal  He exhibits no distension  There is no tenderness  Musculoskeletal: Normal range of motion  He exhibits no edema  Neurological: He is alert and oriented to person, place, and time  Skin: Skin is warm and dry  Vitals reviewed        Invasive Devices     Peripheral Intravenous Line            Peripheral IV 02/16/18 Left Antecubital less than 1 day    Peripheral IV 02/16/18 Left Forearm less than 1 day                Labs:   Recent Results (from the past 24 hour(s))   ECG 12 lead    Collection Time: 02/16/18  5:09 PM   Result Value Ref Range    Ventricular Rate 97 BPM    Atrial Rate 97 BPM    RI Interval 164 ms    QRSD Interval 90 ms    QT Interval 352 ms    QTC Interval 447 ms    P Axis 76 degrees    QRS Axis 74 degrees    T Wave Axis 30 degrees   Comprehensive metabolic panel    Collection Time: 02/16/18  5:22 PM   Result Value Ref Range    Sodium 142 136 - 145 mmol/L    Potassium 3 4 (L) 3 5 - 5 3 mmol/L    Chloride 106 100 - 108 mmol/L    CO2 27 21 - 32 mmol/L    Anion Gap 9 4 - 13 mmol/L    BUN 6 5 - 25 mg/dL    Creatinine 1 21 0 60 - 1 30 mg/dL    Glucose 105 65 - 140 mg/dL    Calcium 8 7 8 3 - 10 1 mg/dL    AST 9 5 - 45 U/L    ALT 15 12 - 78 U/L    Alkaline Phosphatase 65 46 - 116 U/L Total Protein 7 8 6 4 - 8 2 g/dL    Albumin 3 4 (L) 3 5 - 5 0 g/dL    Total Bilirubin 0 84 0 20 - 1 00 mg/dL    eGFR 78 ml/min/1 73sq m   CBC and differential    Collection Time: 02/16/18  5:22 PM   Result Value Ref Range    WBC 7 60 4 31 - 10 16 Thousand/uL    RBC 4 83 3 88 - 5 62 Million/uL    Hemoglobin 12 5 12 0 - 17 0 g/dL    Hematocrit 37 6 36 5 - 49 3 %    MCV 78 (L) 82 - 98 fL    MCH 25 9 (L) 26 8 - 34 3 pg    MCHC 33 2 31 4 - 37 4 g/dL    RDW 14 7 11 6 - 15 1 %    MPV 9 5 8 9 - 12 7 fL    Platelets 729 770 - 497 Thousands/uL    nRBC 0 /100 WBCs    Neutrophils Relative 55 43 - 75 %    Lymphocytes Relative 28 14 - 44 %    Monocytes Relative 13 (H) 4 - 12 %    Eosinophils Relative 4 0 - 6 %    Basophils Relative 0 0 - 1 %    Neutrophils Absolute 4 12 1 85 - 7 62 Thousands/µL    Lymphocytes Absolute 2 16 0 60 - 4 47 Thousands/µL    Monocytes Absolute 0 97 0 17 - 1 22 Thousand/µL    Eosinophils Absolute 0 29 0 00 - 0 61 Thousand/µL    Basophils Absolute 0 03 0 00 - 0 10 Thousands/µL   Troponin I    Collection Time: 02/16/18  5:22 PM   Result Value Ref Range    Troponin I 0 19 (H) <=0 04 ng/mL   BNP    Collection Time: 02/16/18  5:22 PM   Result Value Ref Range    NT-proBNP 2,093 (H) <125 pg/mL   APTT    Collection Time: 02/16/18  7:45 PM   Result Value Ref Range    PTT 24 23 - 35 seconds   Protime-INR    Collection Time: 02/16/18  7:45 PM   Result Value Ref Range    Protime 14 3 12 1 - 14 4 seconds    INR 1 11 0 86 - 1 16   APTT    Collection Time: 02/17/18  2:30 AM   Result Value Ref Range    PTT 95 (H) 23 - 35 seconds   Basic metabolic panel    Collection Time: 02/17/18  2:36 AM   Result Value Ref Range    Sodium 139 136 - 145 mmol/L    Potassium 3 9 3 5 - 5 3 mmol/L    Chloride 106 100 - 108 mmol/L    CO2 26 21 - 32 mmol/L    Anion Gap 7 4 - 13 mmol/L    BUN 8 5 - 25 mg/dL    Creatinine 1 00 0 60 - 1 30 mg/dL    Glucose 98 65 - 140 mg/dL    Glucose, Fasting 98 65 - 99 mg/dL    Calcium 8 5 8 3 - 10 1 mg/dL eGFR 114 ml/min/1 73sq m   BNP    Collection Time: 02/17/18  2:36 AM   Result Value Ref Range    NT-proBNP 2,042 (H) <125 pg/mL   CBC (With Platelets)    Collection Time: 02/17/18  2:36 AM   Result Value Ref Range    WBC 7 00 4 31 - 10 16 Thousand/uL    RBC 4 58 3 88 - 5 62 Million/uL    Hemoglobin 11 6 (L) 12 0 - 17 0 g/dL    Hematocrit 35 4 (L) 36 5 - 49 3 %    MCV 77 (L) 82 - 98 fL    MCH 25 3 (L) 26 8 - 34 3 pg    MCHC 32 8 31 4 - 37 4 g/dL    RDW 14 5 11 6 - 15 1 %    Platelets 750 829 - 197 Thousands/uL    MPV 9 4 8 9 - 12 7 fL   Troponin I    Collection Time: 02/17/18  7:05 AM   Result Value Ref Range    Troponin I 0 13 (H) <=0 04 ng/mL   APTT    Collection Time: 02/17/18  9:18 AM   Result Value Ref Range    PTT 72 (H) 23 - 35 seconds       Radiology Results: I have personally reviewed pertinent reports  Cta Ed Chest Pe Study    Result Date: 2/16/2018  Impression: Numerous bilateral acute pulmonary emboli  The calculated ratio of right ventricular to left ventricular diameter (RV/LV ratio) is 2  This is greater than 0 9, which is abnormal and indicates right heart strain  An abnormal RV/LV ratio has been shown to be associated with an increased risk of 30 day mortality in the setting of acute pulmonary embolism  I personally discussed this study with [employer]  on 2/16/2018 at 8:54 PM  Workstation performed: [id number]       Other Diagnostic Testing:   I have personally reviewed pertinent reports         Results from last 7 days  Lab Units 02/17/18  0236 02/16/18  1722   SODIUM mmol/L 139 142   POTASSIUM mmol/L 3 9 3 4*   CHLORIDE mmol/L 106 106   CO2 mmol/L 26 27   BUN mg/dL 8 6   CREATININE mg/dL 1 00 1 21   CALCIUM mg/dL 8 5 8 7   TOTAL PROTEIN g/dL  --  7 8   BILIRUBIN TOTAL mg/dL  --  0 84   ALK PHOS U/L  --  65   ALT U/L  --  15   AST U/L  --  9   GLUCOSE RANDOM mg/dL 98 105           Active Meds:   Current Facility-Administered Medications   Medication Dose Route Frequency    acetaminophen (TYLENOL) tablet 650 mg  650 mg Oral Q6H PRN    heparin (porcine) 25,000 units in 250 mL infusion (premix)  3-30 Units/kg/hr (Order-Specific) Intravenous Titrated    heparin (porcine) injection 3,800 Units  3,800 Units Intravenous PRN    heparin (porcine) injection 7,600 Units  7,600 Units Intravenous PRN         VTE Pharmacologic Prophylaxis: Heparin  VTE Mechanical Prophylaxis: sequential compression device    Lizzy Sandoval MD

## 2018-02-18 ENCOUNTER — APPOINTMENT (INPATIENT)
Dept: NON INVASIVE DIAGNOSTICS | Facility: HOSPITAL | Age: 33
DRG: 175 | End: 2018-02-18
Payer: MEDICARE

## 2018-02-18 VITALS
BODY MASS INDEX: 33.34 KG/M2 | RESPIRATION RATE: 18 BRPM | TEMPERATURE: 98.6 F | HEIGHT: 68 IN | SYSTOLIC BLOOD PRESSURE: 138 MMHG | HEART RATE: 92 BPM | DIASTOLIC BLOOD PRESSURE: 66 MMHG | OXYGEN SATURATION: 95 % | WEIGHT: 220 LBS

## 2018-02-18 LAB
ANION GAP SERPL CALCULATED.3IONS-SCNC: 8 MMOL/L (ref 4–13)
APTT PPP: 77 SECONDS (ref 23–35)
APTT PPP: 88 SECONDS (ref 23–35)
BUN SERPL-MCNC: 9 MG/DL (ref 5–25)
CALCIUM SERPL-MCNC: 8.8 MG/DL (ref 8.3–10.1)
CHLORIDE SERPL-SCNC: 106 MMOL/L (ref 100–108)
CO2 SERPL-SCNC: 24 MMOL/L (ref 21–32)
CREAT SERPL-MCNC: 1.01 MG/DL (ref 0.6–1.3)
GFR SERPL CREATININE-BSD FRML MDRD: 112 ML/MIN/1.73SQ M
GLUCOSE SERPL-MCNC: 81 MG/DL (ref 65–140)
POTASSIUM SERPL-SCNC: 3.9 MMOL/L (ref 3.5–5.3)
SODIUM SERPL-SCNC: 138 MMOL/L (ref 136–145)

## 2018-02-18 PROCEDURE — 85730 THROMBOPLASTIN TIME PARTIAL: CPT | Performed by: INTERNAL MEDICINE

## 2018-02-18 PROCEDURE — 93306 TTE W/DOPPLER COMPLETE: CPT | Performed by: INTERNAL MEDICINE

## 2018-02-18 PROCEDURE — 93306 TTE W/DOPPLER COMPLETE: CPT

## 2018-02-18 PROCEDURE — 80048 BASIC METABOLIC PNL TOTAL CA: CPT | Performed by: INTERNAL MEDICINE

## 2018-02-18 RX ADMIN — HEPARIN SODIUM 18 UNITS/KG/HR: 10000 INJECTION, SOLUTION INTRAVENOUS at 04:42

## 2018-02-18 NOTE — SOCIAL WORK
CM notified that patient medically stable for d/c  CM given Xarelto scripts for price check  Pt's cost is $78 19  Pt requesting that CM speak with his mother Arun [place] [phone]  Ritu agreeable to cover medication cost and called [employer] with credit card payment  CM retrieved medications from [facility] and delivered to patient's room  No other CM needs identified

## 2018-02-18 NOTE — DISCHARGE SUMMARY
[facility] Discharge Summary - Medical Annika Salazar 34 y o  male MRN: [id number]    [place] [address]      Room / Bed: /-01 Encounter: [id number]    BRIEF OVERVIEW    Admitting Provider: Asim Ferrell MD  Discharge Provider: Asim Ferrell MD  Primary Care Physician at Discharge:   Patient was provided with the informations to establish care at [facility]    Discharge To: Home    Admission Date: 2/16/2018     Discharge Date: 2/18/2018    Primary Discharge Diagnosis  Principal Problem:    Pulmonary embolism ([address] )  Active Problems:    SOB (shortness of breath)    Marijuana abuse    Non-ST elevation myocardial infarction (NSTEMI), type 2 (HCC)    Hypokalemia  Resolved Problems:    Chest pain      Other Problems Addressed:  None    Consulting Providers  - none       Therapeutic Operative Procedures Performed - none    Diagnostic Procedures Performed - CTA chest PE study - Numerous bilateral acute pulmonary emboli  The calculated ratio of right ventricular to left ventricular diameter (RV/LV ratio) is 2  This is greater than 0 9, which is abnormal and indicates right heart strain  An abnormal RV/LV ratio has been shown to be associated with an increased risk of 30  day mortality in the setting of acute pulmonary embolism  Echo of heart - Systolic function was normal  Ejection fraction was estimated in the range of 60 % to 65 %  There were no regional wall motion abnormalities  Right ventricle was mildly dilated  Systolic function was mildly reduced  The free wall has an area of hypokinesis  RV apex hyperdynamic suggestive of pulmonary embolism      Discharged With Lines: no    Test Results Pending at Discharge: none    Outpatient Follow-Up - patient was provided with information to follow up with [facility]  Follow up with consulting providers - patient was provided with information to follow up with hematologist at [place] [address] [facility]  Active Issues Requiring Follow-up  - none    Code Status: Level 1 - Full Code    Medications   See after visit summary for reconciled discharge medications provided to patient and family  Medications   Your Medications     Morning Afternoon Evening Bedtime As Needed    * rivaroxaban 15 mg tablet   Commonly known as: XARELTO   Take 1 tablet (15 mg total) by mouth 2 (two) times a day with meals for 21 days   Refills: 0            * rivaroxaban 20 mg tablet   Commonly known as: Loran Riding   Start taking on: 3/11/2018   Take 1 tablet (20 mg total) by mouth daily with breakfast   Refills: 0               Allergies  No Known Allergies  Discharge Diet: regular diet  Activity restrictions: none    [address] Lisa Del Angel is a 34 y o  black male with past medical history of DVT with PE diagnosed 3 years ago treated with xarelto  Patient stopped taking xarelto on his own prematurely  He presented to the hospital with chest pain and mild dyspnea  Patient was started on heparin drip  He clinically improved and we were able to discharge patient home in stable condition  Patient was admitted to medicine service  His problems were addressed as follows:    - Bilateral acute subsegmental pulmonary emboli with evidence of RV strain - Patient hemodynamically stable   RV/LV ratio equals 2 which indicates possible right heart strain which puts him at increased risk of 30 mortality in the setting of acute PE   Patient was started on heparin drip and transitioned to xarelto upon discharge      - NSTEMI type 2 - likely in the setting of RV strain with #1      - Hypokalemia - resolved    Presenting Problem/History of Present Illness  Principal Problem:    Pulmonary embolism (HCC)  Active Problems:    SOB (shortness of breath)    Marijuana abuse    Non-ST elevation myocardial infarction (NSTEMI), type 2 (HCC)    Hypokalemia  Resolved Problems:    Chest pain    Other Pertinent Test Results Discharge Condition: good    Discharge  Statement   I spent 45 minutes minutes discharging the patient  This time was spent on the day of discharge  I had direct contact with the patient on the day of discharge  Additional documentation is required if more than 30 minutes were spent on discharge  The care was coordinated with case management and nursing staff

## 2018-02-18 NOTE — PROGRESS NOTES
IM Residency Progress Note   Unit/Bed#: -01 Encounter: [id number]  SOD Team B       Suzi Hamilton 34 y o  male [id number]    Hospital Stay Days: 1      Assessment/Plan:    1  Bilateral acute subsegmental pulmonary emboli with evidence of RV strain  · Patient hemodynamically stable  RV/LV ratio equals 2 which indicates possible right heart strain which puts him at increased risk of 30 mortality in the setting of acute PE  · - Will follow up with 2D echocardiogram to assess RV function  · Continue heparin drip, will discuss with case management regarding transition to NOAC pending insurance approval  · Continue telemetry monitoring  · Monitor hemodynamics     2  NSTEMI type 2 - likely in the setting of RV strain with #1      3  Hypokalemia - resolved       Principal Problem:    Pulmonary embolism (HCC)  Active Problems:    SOB (shortness of breath)    Marijuana abuse    Non-ST elevation myocardial infarction (NSTEMI), type 2 (HCC)    Hypokalemia        Disposition: per SOD, for d/c today after Echo at 2pm    Subjective:  Patient feels fine, he has no acute complaints  He denies any chest pain or shortness of breath  Vitals: Temp (24hrs), Av 2 °F (37 3 °C), Min:99 °F (37 2 °C), Max:99 4 °F (37 4 °C)  Current: Temperature: 99 °F (37 2 °C)  Vitals:    18 0731 18 1035 18 1530 18 2345   BP: 117/63 140/71 145/70 134/64   BP Location:   Right arm Left arm   Pulse: 96 93 98 96   Resp:    Temp: 99 7 °F (37 6 °C) 99 4 °F (37 4 °C) 99 3 °F (37 4 °C) 99 °F (37 2 °C)   TempSrc: Oral Oral Tympanic Oral   SpO2: 96% 91% 92% 94%   Weight:       Height:        Body mass index is 33 45 kg/m²  I/O last 24 hours: In: 26 [P O :470]  Out: 400 [Urine:400]      Physical Exam:  Physical Exam   Constitutional: He appears well-developed and well-nourished  HENT:   Head: Normocephalic and atraumatic  Eyes: Pupils are equal, round, and reactive to light   Right eye exhibits no discharge  Left eye exhibits no discharge  No scleral icterus  Neck: Neck supple  Cardiovascular: Normal rate and regular rhythm  No murmur heard  Pulmonary/Chest: Effort normal and breath sounds normal  No respiratory distress  He has no wheezes  Abdominal: Soft  Bowel sounds are normal  He exhibits no distension  There is no tenderness  Musculoskeletal: He exhibits no edema  Neurological: He is alert  Skin: Skin is warm and dry  Psychiatric: He has a normal mood and affect  Invasive Devices     Peripheral Intravenous Line            Peripheral IV 02/16/18 Left Forearm 1 day                Labs:   Recent Results (from the past 24 hour(s))   APTT    Collection Time: 02/17/18  9:18 AM   Result Value Ref Range    PTT 72 (H) 23 - 35 seconds   APTT    Collection Time: 02/17/18  4:13 PM   Result Value Ref Range    PTT 51 (H) 23 - 35 seconds   APTT    Collection Time: 02/18/18  1:03 AM   Result Value Ref Range    PTT 88 (H) 23 - 35 seconds   Basic metabolic panel    Collection Time: 02/18/18  6:31 AM   Result Value Ref Range    Sodium 138 136 - 145 mmol/L    Potassium 3 9 3 5 - 5 3 mmol/L    Chloride 106 100 - 108 mmol/L    CO2 24 21 - 32 mmol/L    Anion Gap 8 4 - 13 mmol/L    BUN 9 5 - 25 mg/dL    Creatinine 1 01 0 60 - 1 30 mg/dL    Glucose 81 65 - 140 mg/dL    Calcium 8 8 8 3 - 10 1 mg/dL    eGFR 112 ml/min/1 73sq m       Radiology Results: I have personally reviewed pertinent reports  Other Diagnostic Testing:   I have personally reviewed pertinent reports          Active Meds:   Current Facility-Administered Medications   Medication Dose Route Frequency    acetaminophen (TYLENOL) tablet 650 mg  650 mg Oral Q6H PRN    heparin (porcine) 25,000 units in 250 mL infusion (premix)  3-30 Units/kg/hr (Order-Specific) Intravenous Titrated    heparin (porcine) injection 3,800 Units  3,800 Units Intravenous PRN    heparin (porcine) injection 7,600 Units  7,600 Units Intravenous PRN VTE Pharmacologic Prophylaxis: Heparin  VTE Mechanical Prophylaxis: sequential compression device    Glenn Morrison MD

## 2018-02-18 NOTE — DISCHARGE INSTRUCTIONS
Pulmonary Embolism   WHAT YOU NEED TO KNOW:   A pulmonary embolism (PE) is the sudden blockage of a blood vessel in the lungs by an embolus  An embolus is a small piece of blood clot, fat, air, or tumor cells  The embolus cuts off the blood supply to your lungs  A pulmonary embolism can become life-threatening  DISCHARGE INSTRUCTIONS:   Call 911 for any of the following:   · You feel lightheaded, short of breath, and have chest pain  · You cough up blood  · You have a seizure  · You have slurred speech, increased sleepiness, or problems seeing, talking, or thinking  · You have weakness or cannot move your arm or leg on one side of your body  Seek care immediately if:   · You feel faint  · You have a severe headache  · Your heart is beating faster than normal   Contact your healthcare provider if:   · The skin on any part of your legs or hips turns purple  · Your gums or nose bleed  · You see blood in your urine or bowel movements  · Your bowel movements are black or darker than normal     · You have questions or concerns about your condition or care  Medicines:   · Blood thinners  help treat the PE and prevent new clots from forming  Examples of blood thinners include heparin, rivaroxaban, apixiban, and warfarin  The following are general safety guidelines to follow while you are taking a blood thinner:     ¨ Watch for bleeding and bruising  Watch for bleeding from your gums or nose  Watch for blood in your urine and bowel movements  Use a soft washcloth on your skin, and a soft toothbrush to brush your teeth  This can keep your skin and gums from bleeding  If you shave, use an electric shaver  Do not play contact sports  ¨ Tell your dentist and other healthcare providers that you take a blood thinner  Wear a bracelet or necklace that says you take this medicine  ¨ Do not start or stop any medicines unless your healthcare provider tells you to   Many medicines cannot be used with blood thinners  ¨ Tell your healthcare provider right away if you forget to take the blood thinner , or if you take too much  ¨ Warfarin  is a blood thinner that you may need to take  The following are additional things you should be aware of if you take warfarin:    § Foods and medicines can affect the amount of warfarin in your blood  Do not make major changes to your diet  Warfarin works best when you eat about the same amount of vitamin K every day  Vitamin K is found in green leafy vegetables and certain other foods  Ask for more information about what to eat or not to eat  § You will need to see your healthcare provider for follow-up visits  You will need regular blood tests to decide how much warfarin you need  · Take your medicine as directed  Contact your healthcare provider if you think your medicine is not helping or if you have side effects  Tell him or her if you are allergic to any medicine  Keep a list of the medicines, vitamins, and herbs you take  Include the amounts, and when and why you take them  Bring the list or the pill bottles to follow-up visits  Carry your medicine list with you in case of an emergency  Prevent another PE:   · Wear pressure stockings  The stockings are tight and put pressure on your legs  This improves blood flow and helps prevent clots  Wear the stockings during the day  Do not wear them when you sleep  · Exercise regularly  Ask about the best exercise plan for you  When you travel by car or work at a desk, take breaks to stand up and move around as much as possible  Rotate your feet in circles often if you sit for a long period of time  · Maintain a healthy weight  Ask your healthcare provider how much you should weigh  Ask him to help you create a weight loss plan if you are overweight  · Do not smoke  Nicotine and other chemicals in cigarettes and cigars can damage blood vessels and increase your risk for another PE   Ask your healthcare provider for information if you currently smoke and need help to quit  E-cigarettes or smokeless tobacco still contain nicotine  Talk to your healthcare provider before you use these products  Follow up with your healthcare provider as directed:  Write down your questions so you remember to ask them during your visits  © 2017 [employer] Information is for End User's use only and may not be sold, redistributed or otherwise used for commercial purposes  All illustrations and images included in CareNotes® are the copyrighted property of [employer]  or Matt Hernandez  The above information is an  only  It is not intended as medical advice for individual conditions or treatments  Talk to your doctor, nurse or pharmacist before following any medical regimen to see if it is safe and effective for you

## 2018-03-24 ENCOUNTER — HOSPITAL ENCOUNTER (EMERGENCY)
Facility: HOSPITAL | Age: 33
Discharge: HOME | End: 2018-03-24
Attending: EMERGENCY MEDICINE | Admitting: EMERGENCY MEDICINE
Payer: MEDICARE

## 2018-03-24 VITALS
BODY MASS INDEX: 37.77 KG/M2 | WEIGHT: 248.4 LBS | OXYGEN SATURATION: 100 % | SYSTOLIC BLOOD PRESSURE: 173 MMHG | DIASTOLIC BLOOD PRESSURE: 80 MMHG | RESPIRATION RATE: 18 BRPM | TEMPERATURE: 98 F | HEART RATE: 66 BPM

## 2018-03-24 DIAGNOSIS — F20.9 SCHIZOPHRENIA (HCC): Primary | ICD-10-CM

## 2018-03-24 LAB
AMPHETAMINES SERPL QL SCN: NEGATIVE
BARBITURATES UR QL: NEGATIVE
BENZODIAZ UR QL: NEGATIVE
COCAINE UR QL: NEGATIVE
ETHANOL EXG-MCNC: 0 MG/DL
METHADONE UR QL: NEGATIVE
OPIATES UR QL SCN: NEGATIVE
PCP UR QL: NEGATIVE
THC UR QL: POSITIVE

## 2018-03-24 PROCEDURE — 99222 1ST HOSP IP/OBS MODERATE 55: CPT | Performed by: PSYCHIATRY & NEUROLOGY

## 2018-03-24 PROCEDURE — 80307 DRUG TEST PRSMV CHEM ANLYZR: CPT | Performed by: EMERGENCY MEDICINE

## 2018-03-24 PROCEDURE — 82075 ASSAY OF BREATH ETHANOL: CPT | Performed by: EMERGENCY MEDICINE

## 2018-03-24 PROCEDURE — 99284 EMERGENCY DEPT VISIT MOD MDM: CPT

## 2018-03-24 RX ORDER — ARIPIPRAZOLE 5 MG/1
5 TABLET ORAL DAILY
Qty: 30 TABLET | Refills: 1 | Status: SHIPPED | OUTPATIENT
Start: 2018-03-24

## 2018-03-24 RX ORDER — ARIPIPRAZOLE 5 MG/1
5 TABLET ORAL DAILY
Status: DISCONTINUED | OUTPATIENT
Start: 2018-03-24 | End: 2018-03-24 | Stop reason: HOSPADM

## 2018-03-24 RX ADMIN — ARIPIPRAZOLE 5 MG: 5 TABLET ORAL at 11:53

## 2018-03-24 NOTE — ED ATTENDING ATTESTATION
Georgia Estrada MD, saw and evaluated the patient  I have discussed the patient with the resident/non-physician practitioner and agree with the resident's/non-physician practitioner's findings, Plan of Care, and MDM as documented in the resident's/non-physician practitioner's note, except where noted  All available labs and Radiology studies were reviewed  At this point I agree with the current assessment done in the Emergency Department  I have conducted an independent evaluation of this patient a history and physical is as follows:   Pt moved from [facility] 2 months ago  Pt is living with uncle Pt has history of schizophrenia on long term antipsychotic  Pt receives long term antipsychotic and has not had for 5 months  Pt is responding to internal stimuli in room but denies hallucination si or hi still caring for self   Uncle is concerned about bizarre behavior and not sleeping PE: Alert nad heart reg lungs clear abd soft nontender MDM: will attempt to find clilnic for long term med identification nd administer in lieu of this pt was seen by psychiatris in ed and meds prescibed     Critical Care Time  CritCare Time    Procedures

## 2018-03-24 NOTE — DISCHARGE INSTRUCTIONS
Schizophrenia   WHAT YOU NEED TO KNOW:   Schizophrenia is a long-term mental disease that affects how your brain works  It is a disease that may change how you think, feel, and behave  You may not be able to know what is real and what is not real  Your thoughts may not be clear, or may jump from one topic to another  DISCHARGE INSTRUCTIONS:   Medicines:   · Antipsychotics: These help decrease psychotic symptoms and severe agitation  You may need antiparkinson medicine to control muscle stiffness, twitches, and restlessness caused by antipsychotic medicines  · Antianxiety medicine: This medicine may be given to decrease anxiety and help you feel calm and relaxed  · Antidepressants: These help with symptoms of depression and anxiety  · Mood stabilizers: These control mood swings  · Tranquilizers: These increase feelings of being calm and relaxed  · Take your medicine as directed  Contact your healthcare provider if you think your medicine is not helping or if you have side effects  Tell him of her if you are allergic to any medicine  Keep a list of the medicines, vitamins, and herbs you take  Include the amounts, and when and why you take them  Bring the list or the pill bottles to follow-up visits  Carry your medicine list with you in case of an emergency  Follow up with your healthcare provider or psychiatrist as directed:  Write down your questions so you remember to ask them during your visits  Manage your symptoms:   · Do not stop taking your medicines:  Tell your healthcare provider or psychiatrist if you have any problems with or questions about your medicines  · Do not stop your therapies: It is normal to have doubts about or to feel discomfort with your therapy  Tell your healthcare provider or psychiatrist if you are not comfortable or have questions about your therapies  · Get regular sleep:  Try to get 6 to 8 hours of sleep each night   Tell your healthcare provider or psychiatrist if you are not able to sleep, or if you are sleeping too much  · Do not drink alcohol:  Alcohol interacts with medicine used to treat schizophrenia  Contact your healthcare provider or psychiatrist if:   · You feel that you are having symptoms of schizophrenia  · You are not able to sleep well, or are sleeping more than usual     · You cannot eat or are eating more than usual     · You have questions about your condition or care  Return to the emergency department if:   · You think about killing yourself or someone else  · You have a rash, swelling, or trouble breathing after you take your medicine  © 2017 [address] [employer] Information is for End User's use only and may not be sold, redistributed or otherwise used for commercial purposes  All illustrations and images included in CareNotes® are the copyrighted property of [employer]  or [address]  The above information is an  only  It is not intended as medical advice for individual conditions or treatments  Talk to your doctor, nurse or pharmacist before following any medical regimen to see if it is safe and effective for you

## 2018-03-24 NOTE — ED PROVIDER NOTES
History  Chief Complaint   Patient presents with    Psychiatric Evaluation     Patient brought in by uncle today, when asked why he is in the emergency room patient replies with "mart, when asked to elaborate more on this he refuesed"  Denies SI/HI     28-year-old male presents to the emergency department by his uncle for complaints of insomnia and strange behavior  Patient has a history of schizophrenia and previously had been getting long-acting antipsychotic medication injections while he was in [place] has not received any injections in over 5 months  Patient has been in the [facility] for approximately 2 months and has no psychiatric follow-up at this time  Patient has a history of DVT/PE treated a few months ago and is currently on Xarelto states that he takes his medications daily but takes no other medications  Patient has no complaints at this time he denies suicidal or homicidal ideation denies audio or visual hallucinations  Patient appears to be responding to internal stimuli seems paranoid and agitated in the room wanting to leave to go home  Patient states he followed with a psychiatrist in [place] before transferring and provided their contact information  Remaining ROS negative  History provided by:  Patient   used: No    Psychiatric Evaluation   Presenting symptoms: bizarre behavior, disorganized speech and disorganized thought process    Presenting symptoms: no agitation, no suicidal threats and no suicide attempt    Patient accompanied by: uncle  Degree of incapacity (severity): Moderate  Onset quality:  Gradual  Timing:  Constant  Progression:  Worsening  Chronicity:  New  Context: drug abuse and noncompliance    Treatment compliance:  Untreated  Relieved by:   Antipsychotics  Associated symptoms: no abdominal pain, no chest pain, no fatigue and no headaches    Risk factors: hx of mental illness        Prior to Admission Medications   Prescriptions Last Dose Informant Patient Reported? Taking? rivaroxaban (XARELTO) 15 mg tablet   No No   Sig: Take 1 tablet (15 mg total) by mouth 2 (two) times a day with meals for 21 days   rivaroxaban (XARELTO) 20 mg tablet   No No   Sig: Take 1 tablet (20 mg total) by mouth daily with breakfast      Facility-Administered Medications: None       Past Medical History:   Diagnosis Date    DVT [facility]deep venous thrombosis) ([facility])     Schizophrenia ([facility] [address] )        History reviewed  No pertinent surgical history  Family History   Problem Relation Age of Onset    No Known Problems Mother     No Known Problems Father      I have reviewed and agree with the history as documented  Social History   Substance Use Topics    Smoking status: Former Smoker     Types: Cigarettes    Smokeless tobacco: Former User    Alcohol use Yes        Review of Systems   Constitutional: Negative for chills, fatigue and fever  HENT: Negative for sore throat  Eyes: Negative for visual disturbance  Respiratory: Negative for shortness of breath  Cardiovascular: Negative for chest pain  Gastrointestinal: Negative for abdominal pain, blood in stool, constipation, diarrhea, nausea and vomiting  Genitourinary: Negative for difficulty urinating, dysuria and hematuria  Musculoskeletal: Negative for arthralgias  Skin: Negative for rash  Neurological: Negative for syncope, weakness and headaches  Hematological: Negative for adenopathy  Psychiatric/Behavioral: Negative for agitation and behavioral problems  All other systems reviewed and are negative        Physical Exam  ED Triage Vitals [03/24/18 0834]   Temperature Pulse Respirations Blood Pressure SpO2   98 °F (36 7 °C) 67 18 153/76 99 %      Temp Source Heart Rate Source Patient Position - Orthostatic VS BP Location FiO2 (%)   Oral Monitor Sitting Right arm --      Pain Score       No Pain           Orthostatic Vital Signs  Vitals:    03/24/18 0834 03/24/18 0956 03/24/18 1200   BP: 153/76 126/70 (!) 173/80   Pulse: 67 75 66   Patient Position - Orthostatic VS: Sitting Lying        Physical Exam   Constitutional: He is oriented to person, place, and time  He appears well-developed and well-nourished  HENT:   Head: Normocephalic and atraumatic  Eyes: Conjunctivae and EOM are normal  No scleral icterus  Neck: Normal range of motion  Neck supple  Cardiovascular: Normal rate and regular rhythm  No murmur heard  Pulmonary/Chest: Effort normal and breath sounds normal    Abdominal: Soft  Bowel sounds are normal  There is no tenderness  Musculoskeletal: Normal range of motion  Neurological: He is alert and oriented to person, place, and time  Skin: Skin is warm and dry  Psychiatric: He has a normal mood and affect  His behavior is normal    Nursing note and vitals reviewed  ED Medications  Medications - No data to display    Diagnostic Studies  Results Reviewed     Procedure Component Value Units Date/Time    Rapid drug screen, urine [[id number]]  (Abnormal) Collected:  03/24/18 0913    Lab Status:  Final result Specimen:  Urine from Urine, Other Updated:  03/24/18 1155     Amph/Meth UR Negative     Barbiturate Ur Negative     Benzodiazepine Urine Negative     Cocaine Urine Negative     Methadone Urine Negative     Opiate Urine Negative     PCP Ur Negative     THC Urine Positive (A)    Narrative:         Presumptive report  If requested, specimen will be sent to reference lab for confirmation  FOR MEDICAL PURPOSES ONLY  IF CONFIRMATION NEEDED PLEASE CONTACT THE LAB WITHIN 5 DAYS      Drug Screen Cutoff Levels:  AMPHETAMINE/METHAMPHETAMINES  1000 ng/mL  BARBITURATES     200 ng/mL  BENZODIAZEPINES     200 ng/mL  COCAINE      300 ng/mL  METHADONE      300 ng/mL  OPIATES      300 ng/mL  PHENCYCLIDINE     25 ng/mL  THC       50 ng/mL    POCT alcohol breath test [[id number]]  (Normal) Resulted:  03/24/18 0912    Lab Status:  Final result Updated:  03/24/18 0912     EXTBreath Alcohol 0 000 No orders to display         Procedures  Procedures      Phone Consults  ED Phone Contact    ED Course  ED Course                                MDM  Number of Diagnoses or Management Options  Schizophrenia [facility]): established and worsening  Diagnosis management comments: Called multiple times to patient's original treatment center in [place] without avail, unable to retrieve medical records  Spoke with psychiatry from our hospital and patient will be evaluated by them  Psychiatry evaluated the patient and recommended Abilify as patient previously had been on Abilify, will write patient for 1 month prescription with 1 month refill and have patient follow up with crisis recommendations for outpatient management  Amount and/or Complexity of Data Reviewed  Clinical lab tests: ordered and reviewed  Tests in the medicine section of CPT®: ordered and reviewed  Review and summarize past medical records: yes  Discuss the patient with other providers: yes      CritCare Time    Disposition  Final diagnoses:   Schizophrenia ([address] )     Time reflects when diagnosis was documented in both MDM as applicable and the Disposition within this note     Time User Action Codes Description Comment    3/24/2018 10:43 AM Sury Cruz Add [F20 9] Schizophrenia [facility])       ED Disposition     ED Disposition Condition Comment    Discharge  [facility] discharge to home/self care      Condition at discharge: Stable        MD Documentation    Alberto Banegas Most Recent Value   Sending MD Dr Kim Portal up With Specialties Details Why Contact Info Additional Information    Ragini Monroe MD Psychiatry   [address] [address]  [phone]       [address] [place] [address] Emergency Department Emergency Medicine Go to If symptoms worsen [address]  [phone]  ED, [address] [place] [address], [id number]        Discharge Medication List as of 3/24/2018 11:46 AM      START taking these medications    Details   ARIPiprazole (ABILIFY) 5 mg tablet Take 1 tablet (5 mg total) by mouth daily, Starting Sat 3/24/2018, Print         CONTINUE these medications which have NOT CHANGED    Details   rivaroxaban (XARELTO) 20 mg tablet Take 1 tablet (20 mg total) by mouth daily with breakfast, Starting Sun 3/11/2018, Print           No discharge procedures on file  ED Provider  Attending physically available and evaluated Rebeca Tinajero I managed the patient along with the ED Attending      Electronically Signed by         Vonna Koyanagi, MD  03/24/18 7609

## 2018-03-24 NOTE — CONSULTS
Psychiatric Evaluation - Behavioral Health       Assessment/Plan  Principal Problem:  F24  schizoaffective disorder bipolar type      PLAN:   1) start patient on Abilify 5 mg p o  daily patient may be given a script for 30 days with one refill  2) patient was on Abilify in the past was discussed with patient and his mother on the phone who is not on the floor  Patient is willing to take this medication  Patient's uncle was also present during the time of interview agreed that he would make sure that patient takes the medication and also agreed that he would make sure that patient follows up with mental health care outpatient  This writer feels comfortable that patient can take this medication and may be discharged on this medication for a month with one refill  3) Communicated with patients' medical team       Chief Complaint: "They say I have mental illness "    History of Present Illness: This is a 48-year-old [place] American male who was brought into ER by his uncle to the complained of a strange behavior and insomnia  Patient's uncle said that patient has not been sleeping for last few nights he is taking long showers and acting strange  Patient has history of schizophrenia he has moved recently here from [facility]  Patient is supposed to be on injectable antipsychotic medication  It was reported that patient is a gone off his medication for last five months and since then he has gone downhill and acting strange and paranoid  Patient is not aggressive but is having disorganized thought process  When this writer talked to patient he had grin on his face was smiling inappropriately  He had hypersexual behaviors  He made inappropriate sexual remarks towards this writer  He said that he does hear voices and feels TV talks to him  He said he is not homicidal or suicidal and is not having active hallucinations    He did tell this writer that he used to take antipsychotics after much discussion he told this writer that he was on Abilify  Later in the interview patient's uncle also came in the room and allow this writer talked to patient's mother was in [place] mother told this writer that patient was on Abilify and Haldol  Patient agreed to take oral Abilify until he can be followed up outpatient to be on injectable medication  Patient's mother said that patient was on injectable in the past because he would not take oral medication would get sick  Patient is not aggressive he does not have history of violence as per patient and his family  PAST PSYCH HISTORY:    Patient says that he has been in inpatient psychiatric hospital several times in the past but not recently there is no verification of this history  Substance Abuse History:    Denies any drug or alcohol use  Family Psychiatric History:   No mental health history available    Social History:  Education:  [employer] he has graduated from [employer]  Learning Disabilities: None  Marital history:  Single has no kids  Living arrangement, social support:  Patient recently moved from [facility] and living with his uncle  Occupational History:  Unemployed on disability  Functioning Relationships:  Good support system  Other Pertinent History: None      Traumatic History:   Abuse: None  Other Traumatic Events: None  Past Medical History:   Diagnosis Date    DVT (deep venous thrombosis) ([facility] [address] )        Medical Review Of Systems:  All 12 point review of system is normal except for what is mention in medical hisotry  Scheduled Meds:  Current Facility-Administered Medications:  ARIPiprazole 5 mg Oral Daily Venus Holden MD     Continuous Infusions:   PRN Meds:      No Known Allergies     Vitals:    03/24/18 0834 03/24/18 0956   BP: 153/76 126/70   BP Location: Right arm Right arm   Pulse: 67 75   Resp: 18    Temp: 98 °F (36 7 °C)    TempSrc: Oral    SpO2: 99% 96%   Weight: 113 kg (248 lb 6 4 oz) Mental Status Evaluation:  Appearance:  Patient appeared younger than his age was wearing [facility] paper scrubs  Behavior:  Patient's behavior was cooperative overly friendly hypersexual he was not a smiling inappropriately  Speech:  Speech was soft monotonous overly inclusive he was talking and parables  Mood:  He said his mood was one   Affect Affect is elated happy  Language: naming objects and Goal directed  Thought Process:  Illogical scattered  Thought Content:  He has grandiosity hypersexuality  Perceptual Disturbances: He said he feels TV talks to him  Risk Potential: Any suicidal homicidal ideas he is not aggressive or agitated   Sensorium:  person, place, time/date, situation, day of week, month of year and year   Cognition:  grossly intact   Consciousness:   alert, awake, and oriented ×3    Attention: Poor attention span   Intellect: Normal   Fund of Knowledge: Was not able to assess he is not very cooperative   Insight:  Poor insight   Judgment: Impaired judgment   Muscle Strength and Tone: Normal    Gait/Station:  gait was not assessed    Motor Activity: no abnormal movements     Lab Results: I have personally reviewed pertinent lab results  NOTE:  Total of 40 minutes were spent in talking to patient completing this medical record reviewing medical chart medical decision making    Cyndi Navarro MD

## 2018-03-24 NOTE — ED NOTES
Pt denies SI, HI, and psychosis  Pt admits to being off psychiatric medications for several months  Pt stated that he feels fine  Pt stated that his uncle does not understand him and that is why he brought him in to ED  Pt moved here from [place] two months ago for 'change in scenery "  Pt denies previous suicide attempts  Psychiatrist saw Pt  Pt d/c to f/u with [facility] for intake for OP  provider and change MA to PA

## 2018-04-02 ENCOUNTER — APPOINTMENT (EMERGENCY)
Dept: NON INVASIVE DIAGNOSTICS | Facility: HOSPITAL | Age: 33
DRG: 605 | End: 2018-04-02
Payer: MEDICARE

## 2018-04-02 ENCOUNTER — APPOINTMENT (EMERGENCY)
Dept: RADIOLOGY | Facility: HOSPITAL | Age: 33
DRG: 605 | End: 2018-04-02
Payer: MEDICARE

## 2018-04-02 ENCOUNTER — HOSPITAL ENCOUNTER (INPATIENT)
Facility: HOSPITAL | Age: 33
LOS: 1 days | Discharge: HOME | DRG: 605 | End: 2018-04-04
Attending: EMERGENCY MEDICINE | Admitting: INTERNAL MEDICINE
Payer: MEDICARE

## 2018-04-02 DIAGNOSIS — R60.9 SWELLING: ICD-10-CM

## 2018-04-02 DIAGNOSIS — M25.432 WRIST SWELLING, LEFT: Primary | ICD-10-CM

## 2018-04-02 DIAGNOSIS — L03.114 CELLULITIS OF LEFT HAND: ICD-10-CM

## 2018-04-02 DIAGNOSIS — M25.532 WRIST PAIN, ACUTE, LEFT: ICD-10-CM

## 2018-04-02 DIAGNOSIS — R60.9 EDEMA: ICD-10-CM

## 2018-04-02 DIAGNOSIS — M25.432 PAIN AND SWELLING OF LEFT WRIST: ICD-10-CM

## 2018-04-02 DIAGNOSIS — M25.532 PAIN AND SWELLING OF LEFT WRIST: ICD-10-CM

## 2018-04-02 LAB
ANION GAP SERPL CALCULATED.3IONS-SCNC: 6 MMOL/L (ref 4–13)
BASOPHILS # BLD AUTO: 0.04 THOUSANDS/ΜL (ref 0–0.1)
BASOPHILS NFR BLD AUTO: 1 % (ref 0–1)
BUN SERPL-MCNC: 7 MG/DL (ref 5–25)
CALCIUM SERPL-MCNC: 9.1 MG/DL (ref 8.3–10.1)
CHLORIDE SERPL-SCNC: 107 MMOL/L (ref 100–108)
CO2 SERPL-SCNC: 25 MMOL/L (ref 21–32)
CREAT SERPL-MCNC: 1.03 MG/DL (ref 0.6–1.3)
CRP SERPL QL: 38.4 MG/L
CRYSTALS SNV QL MICRO: NORMAL
EOSINOPHIL # BLD AUTO: 0.36 THOUSAND/ΜL (ref 0–0.61)
EOSINOPHIL NFR BLD AUTO: 5 % (ref 0–6)
ERYTHROCYTE [DISTWIDTH] IN BLOOD BY AUTOMATED COUNT: 14.2 % (ref 11.6–15.1)
ERYTHROCYTE [SEDIMENTATION RATE] IN BLOOD: 37 MM/HOUR (ref 0–10)
GFR SERPL CREATININE-BSD FRML MDRD: 110 ML/MIN/1.73SQ M
GLUCOSE SERPL-MCNC: 81 MG/DL (ref 65–140)
GRAM STN SPEC: NORMAL
GRAM STN SPEC: NORMAL
HCT VFR BLD AUTO: 37.5 % (ref 36.5–49.3)
HGB BLD-MCNC: 12.3 G/DL (ref 12–17)
LYMPHOCYTES # BLD AUTO: 1.88 THOUSANDS/ΜL (ref 0.6–4.47)
LYMPHOCYTES NFR BLD AUTO: 24 % (ref 14–44)
MCH RBC QN AUTO: 25.3 PG (ref 26.8–34.3)
MCHC RBC AUTO-ENTMCNC: 32.8 G/DL (ref 31.4–37.4)
MCV RBC AUTO: 77 FL (ref 82–98)
MONOCYTES # BLD AUTO: 1.45 THOUSAND/ΜL (ref 0.17–1.22)
MONOCYTES NFR BLD AUTO: 18 % (ref 4–12)
NEUTROPHILS # BLD AUTO: 4.11 THOUSANDS/ΜL (ref 1.85–7.62)
NEUTS SEG NFR BLD AUTO: 52 % (ref 43–75)
NRBC BLD AUTO-RTO: 0 /100 WBCS
PLATELET # BLD AUTO: 375 THOUSANDS/UL (ref 149–390)
PMV BLD AUTO: 9.3 FL (ref 8.9–12.7)
POTASSIUM SERPL-SCNC: 3.9 MMOL/L (ref 3.5–5.3)
RBC # BLD AUTO: 4.86 MILLION/UL (ref 3.88–5.62)
RBC # SNV MANUAL: ABNORMAL /UL (ref 0–10)
SODIUM SERPL-SCNC: 138 MMOL/L (ref 136–145)
WBC # BLD AUTO: 7.86 THOUSAND/UL (ref 4.31–10.16)
WBC # FLD MANUAL: 1670 /UL (ref 0–200)

## 2018-04-02 PROCEDURE — 0R9P3ZX DRAINAGE OF LEFT WRIST JOINT, PERCUTANEOUS APPROACH, DIAGNOSTIC: ICD-10-PCS | Performed by: ORTHOPAEDIC SURGERY

## 2018-04-02 PROCEDURE — 86140 C-REACTIVE PROTEIN: CPT | Performed by: EMERGENCY MEDICINE

## 2018-04-02 PROCEDURE — 87205 SMEAR GRAM STAIN: CPT | Performed by: ORTHOPAEDIC SURGERY

## 2018-04-02 PROCEDURE — 80048 BASIC METABOLIC PNL TOTAL CA: CPT | Performed by: EMERGENCY MEDICINE

## 2018-04-02 PROCEDURE — 73110 X-RAY EXAM OF WRIST: CPT

## 2018-04-02 PROCEDURE — 87040 BLOOD CULTURE FOR BACTERIA: CPT | Performed by: STUDENT IN AN ORGANIZED HEALTH CARE EDUCATION/TRAINING PROGRAM

## 2018-04-02 PROCEDURE — 85025 COMPLETE CBC W/AUTO DIFF WBC: CPT | Performed by: EMERGENCY MEDICINE

## 2018-04-02 PROCEDURE — 93971 EXTREMITY STUDY: CPT

## 2018-04-02 PROCEDURE — 99223 1ST HOSP IP/OBS HIGH 75: CPT | Performed by: INTERNAL MEDICINE

## 2018-04-02 PROCEDURE — 89050 BODY FLUID CELL COUNT: CPT | Performed by: ORTHOPAEDIC SURGERY

## 2018-04-02 PROCEDURE — 36415 COLL VENOUS BLD VENIPUNCTURE: CPT | Performed by: EMERGENCY MEDICINE

## 2018-04-02 PROCEDURE — 85652 RBC SED RATE AUTOMATED: CPT | Performed by: EMERGENCY MEDICINE

## 2018-04-02 PROCEDURE — 99285 EMERGENCY DEPT VISIT HI MDM: CPT

## 2018-04-02 PROCEDURE — 89051 BODY FLUID CELL COUNT: CPT | Performed by: ORTHOPAEDIC SURGERY

## 2018-04-02 PROCEDURE — 89060 EXAM SYNOVIAL FLUID CRYSTALS: CPT | Performed by: ORTHOPAEDIC SURGERY

## 2018-04-02 PROCEDURE — 93971 EXTREMITY STUDY: CPT | Performed by: SURGERY

## 2018-04-02 PROCEDURE — 96372 THER/PROPH/DIAG INJ SC/IM: CPT

## 2018-04-02 PROCEDURE — 87070 CULTURE OTHR SPECIMN AEROBIC: CPT | Performed by: ORTHOPAEDIC SURGERY

## 2018-04-02 RX ORDER — ACETAMINOPHEN 325 MG/1
975 TABLET ORAL ONCE
Status: COMPLETED | OUTPATIENT
Start: 2018-04-02 | End: 2018-04-02

## 2018-04-02 RX ORDER — HYDRALAZINE HYDROCHLORIDE 20 MG/ML
5 INJECTION INTRAMUSCULAR; INTRAVENOUS EVERY 6 HOURS PRN
Status: DISCONTINUED | OUTPATIENT
Start: 2018-04-02 | End: 2018-04-02

## 2018-04-02 RX ORDER — IBUPROFEN 400 MG/1
400 TABLET ORAL EVERY 6 HOURS PRN
Status: DISCONTINUED | OUTPATIENT
Start: 2018-04-02 | End: 2018-04-02

## 2018-04-02 RX ORDER — CLINDAMYCIN PHOSPHATE 600 MG/50ML
600 INJECTION INTRAVENOUS ONCE
Status: COMPLETED | OUTPATIENT
Start: 2018-04-02 | End: 2018-04-02

## 2018-04-02 RX ORDER — IBUPROFEN 600 MG/1
600 TABLET ORAL EVERY 6 HOURS PRN
Status: DISCONTINUED | OUTPATIENT
Start: 2018-04-02 | End: 2018-04-02

## 2018-04-02 RX ORDER — IBUPROFEN 400 MG/1
400 TABLET ORAL EVERY 6 HOURS PRN
Status: DISCONTINUED | OUTPATIENT
Start: 2018-04-02 | End: 2018-04-03

## 2018-04-02 RX ORDER — ACETAMINOPHEN 325 MG/1
650 TABLET ORAL EVERY 6 HOURS PRN
Status: DISCONTINUED | OUTPATIENT
Start: 2018-04-02 | End: 2018-04-02

## 2018-04-02 RX ORDER — KETOROLAC TROMETHAMINE 30 MG/ML
15 INJECTION, SOLUTION INTRAMUSCULAR; INTRAVENOUS EVERY 6 HOURS PRN
Status: DISCONTINUED | OUTPATIENT
Start: 2018-04-02 | End: 2018-04-03

## 2018-04-02 RX ORDER — ONDANSETRON 2 MG/ML
4 INJECTION INTRAMUSCULAR; INTRAVENOUS EVERY 6 HOURS PRN
Status: DISCONTINUED | OUTPATIENT
Start: 2018-04-02 | End: 2018-04-02

## 2018-04-02 RX ORDER — DOCUSATE SODIUM 100 MG/1
100 CAPSULE, LIQUID FILLED ORAL 2 TIMES DAILY PRN
Status: DISCONTINUED | OUTPATIENT
Start: 2018-04-02 | End: 2018-04-02

## 2018-04-02 RX ORDER — ARIPIPRAZOLE 5 MG/1
5 TABLET ORAL DAILY
Status: DISCONTINUED | OUTPATIENT
Start: 2018-04-03 | End: 2018-04-04 | Stop reason: HOSPADM

## 2018-04-02 RX ORDER — KETOROLAC TROMETHAMINE 30 MG/ML
15 INJECTION, SOLUTION INTRAMUSCULAR; INTRAVENOUS ONCE
Status: COMPLETED | OUTPATIENT
Start: 2018-04-02 | End: 2018-04-02

## 2018-04-02 RX ADMIN — KETOROLAC TROMETHAMINE 15 MG: 30 INJECTION, SOLUTION INTRAMUSCULAR at 09:44

## 2018-04-02 RX ADMIN — CLINDAMYCIN PHOSPHATE 600 MG: 12 INJECTION, SOLUTION INTRAMUSCULAR; INTRAVENOUS at 15:40

## 2018-04-02 RX ADMIN — ACETAMINOPHEN 975 MG: 325 TABLET ORAL at 09:20

## 2018-04-02 RX ADMIN — IBUPROFEN 600 MG: 600 TABLET ORAL at 18:37

## 2018-04-02 NOTE — ED NOTES
Left lower arm painful  Unable to palpate pulse due to extreme pain  Positioned on pillow at heart level    Pain increased without any movement     Haroldo ThakkarMain Line [facility]  04/02/18 0264

## 2018-04-02 NOTE — H&P
INTERNAL MEDICINE HISTORY AND PHYSICAL  [facility] 823-01 SOD Team A    NAME: Jennifer Brewer  AGE: 34 y o  SEX: male  : 1985   MRN: [id number]  ENCOUNTER: [id number]    DATE: 2018  TIME: 5:49 PM    Primary Care Physician: No primary care provider on file  Admitting Provider: Lucero Chatman DO    Assessment and Plan      Principal Problem:    Pain and swelling of left wrist  Active Problems:    Pulmonary embolism (HCC)    Marijuana abuse    Schizophrenia (HCC)    Left wrist pain and swelling  -Onset 3 days prior to admission with no inciting event or trauma  Decreased active ROM and significant tenderness with passive ROM of the wrist and fingers    -At this point, the differential is broad and includes gout, pseudogout, septic arthritis, cellulitis, dactylitis  -XR L wrist on  shows dorsal tissue swelling, no osseous abnormality  -UE duplex on  shows no acute DVT or superficial thrombophlebitis  -F/u blood cultures from    -Ortho consulted for possible joint aspiration   -Pain control with tylenol 650mg po q6h prn for mild and moderate pain, ibuprofen 600mg q6h for severe and breakthrough pain  History of Pulmonary embolism  -Continue Xarelto 20mg po daily and SCDs  Schizophrenia  -Stable  -Continue Abilify 5mg po daily   -Patient follows up with a psychiatrist as an outpatient  Chief complaint: left wrist pain    History of Present Illness     Jennifer Brewer is a 34 y o  male with PMH of PE on Xarelto, schizophrenia on abilify who presented on  with a 3 day history of worsening left wrist pain with radiation into the hand  The pain is worse with movement of the fingers or wrist but is not associated with numbness or tingling  He does not recall any inciting event, trauma or other injury to the area  No recent insect bite or wound to the region  The only change in the past week was re-starting his abilify which he had not been taking for over 5 months   Otherwise he denied any fevers, chills, rash  In the ED, he was mildly hypertensive to 150/80  Labwork remarkable for CRP 38  XR wrist showed mild dorsal soft tissue swelling w/o acute osseous abnormality  UE duplex showed no evidence of DVT or superficial thrombophlebitis  He received a dose of toradol for pain and clindamycin and was subsequently admitted to SOD observation for further management  Review of Systems   Review of Systems   Constitutional: Negative for chills and fever  HENT: Negative for congestion  Eyes: Negative for visual disturbance  Respiratory: Negative for chest tightness, shortness of breath and wheezing  Cardiovascular: Negative for chest pain, palpitations and leg swelling  Gastrointestinal: Negative for abdominal distention, abdominal pain, nausea and vomiting  Genitourinary: Negative for difficulty urinating and dysuria  Musculoskeletal: Positive for arthralgias and joint swelling  Negative for gait problem and neck pain  Skin: Negative for color change, rash and wound  Neurological: Negative for dizziness, seizures, syncope and headaches  Psychiatric/Behavioral: Negative for confusion  Past Medical History     Past Medical History:   Diagnosis Date    [facility]     [facility]     [facility]        Past Surgical History   History reviewed  No pertinent surgical history  Social History     History   Alcohol Use    Yes     Comment: former user     History   Drug Use    Types: Marijuana     History   Smoking Status    Never Smoker   Smokeless Tobacco    Former User       Family History     Family History   Problem Relation Age of Onset    No Known Problems Mother     No Known Problems Father        Medications Prior to Admission     Prior to Admission medications    Medication Sig Start Date End Date Taking?  Authorizing Provider   ARIPiprazole (ABILIFY) 5 mg tablet Take 1 tablet (5 mg total) by mouth daily 3/24/18  Yes Valerian Zoila Hurley MD   rivaroxaban (XARELTO) 20 mg tablet Take 1 tablet (20 mg total) by mouth daily with breakfast 3/11/18  Yes Enio Ivey MD   rivaroxaban (XARELTO) 15 mg tablet Take 1 tablet (15 mg total) by mouth 2 (two) times a day with meals for 21 days 2/18/18 3/11/18  Enio Ivey MD       Allergies   No Known Allergies    Objective     Vitals:    04/02/18 0852 04/02/18 1454 04/02/18 1554 04/02/18 1635   BP: 153/85 137/63 132/63 136/64   BP Location:  Right arm Right arm Right arm   Pulse: 95 86 85 76   Resp: 20   16   Temp: 98 5 °F (36 9 °C)   98 7 °F (37 1 °C)   TempSrc: Oral   Oral   SpO2: 99% 99% 98% 98%   Weight: 104 kg (230 lb)   109 kg (239 lb 6 7 oz)   Height: 5' 7" (1 702 m)   5' 7" (1 702 m)     Body mass index is 37 5 kg/m²  Intake/Output Summary (Last 24 hours) at 04/02/18 1749  Last data filed at 04/02/18 1609   Gross per 24 hour   Intake               50 ml   Output                0 ml   Net               50 ml     Invasive Devices     Peripheral Intravenous Line            Peripheral IV 04/02/18 Right Antecubital less than 1 day                Physical Exam  GENERAL: Appears well-developed and well-nourished  Appears to be in mild distress  HEENT: Normocephalic and atraumatic  No scleral icterus  PERRL  EOMI B/L  No oropharyngeal edema  MM moist    NECK: Neck supple with no lymphadenopathy  Trachea midline  CARDIOVASCULAR: S1 and S2 are present  Regular rate and rhythm  No murmurs  RESPIRATORY: CTA B/L, no rales, rhonci or wheezes  ABDOMINAL: Bowel sounds present, non-tender, soft, non-distended  No rebound, or guarding  EXTREMITIES: 2+ radial pulses bilaterally; no cyanosis, clubbing, edema  MUSCULOSKELETAL: Swelling over the left wrist dorsum with pain to active and passive flexion/extension  Decreased flexion of fingers secondary to pain  NEUROLOGIC: Patient is alert and oriented to person, place, and time  No sensory or motor deficits  CN 2-12 intact  Speech fluent  SKIN: Skin is warm and dry  Left wrist slightly warmer than right wrist  No erythema or rash noted  PSYCHIATRIC: Normal mood and affect     Lab Results: I have personally reviewed pertinent reports  CBC:     Results from last 7 days  Lab Units 04/02/18  0943   WBC Thousand/uL 7 86   RBC Million/uL 4 86   HEMOGLOBIN g/dL 12 3   HEMATOCRIT % 37 5   MCV fL 77*   MCH pg 25 3*   MCHC g/dL 32 8   RDW % 14 2   MPV fL 9 3   PLATELETS Thousands/uL 375   NRBC AUTO /100 WBCs 0   NEUTROS PCT % 52   LYMPHS PCT % 24   MONOS PCT % 18*   EOS PCT % 5   BASOS PCT % 1   NEUTROS ABS Thousands/µL 4 11   LYMPHS ABS Thousands/µL 1 88   MONOS ABS Thousand/µL 1 45*   EOS ABS Thousand/µL 0 36   , Chemistry Profile:     Results from last 7 days  Lab Units 04/02/18  0943   SODIUM mmol/L 138   POTASSIUM mmol/L 3 9   CHLORIDE mmol/L 107   CO2 mmol/L 25   ANION GAP mmol/L 6   BUN mg/dL 7   CREATININE mg/dL 1 03   GLUCOSE RANDOM mg/dL 81   CALCIUM mg/dL 9 1   EGFR ml/min/1 73sq m 110   , Coagulation Studies:   , Cardiac Studies:   , Additional Labs:   , iSTAT CHEM 8:     Results from last 7 days  Lab Units 04/02/18  0943   EGFR ml/min/1 73sq m 110   GLUCOSE RANDOM mg/dL 81   HEMOGLOBIN g/dL 12 3   , ABG:   , Toxicology:   , Last A1C/Lipid Panel/Thyroid Panel: No results found for: HGBA1C, TRIG, CHOL, HDL, LDLCALC, [id number], T3FREE, [id number], FREET4    Imaging: I have personally reviewed pertinent films in PACS  Xr Wrist 3+ Views Left    Result Date: 4/2/2018  Impression: No acute osseous abnormality  Mild dorsal soft tissue swelling  Workstation performed: [id number]       Microbiology: cultures obtained in emergency department include  No results found for: [employer], SPUTUMCULTUR    Urinalysis:       Invalid input(s): URIBILINOGEN     Urine Micro:        EKG, Pathology, and Other Studies: I have personally reviewed pertinent reports        Medications given in Emergency Department     Medication Administration - last 24 hours from 04/01/2018 1749 to 04/02/2018 1749       Date/Time Order Dose Route Action Action by     04/02/2018 0944 ketorolac (TORADOL) injection 15 mg 15 mg Intramuscular Given Desire Marin RN     04/02/2018 0920 acetaminophen (TYLENOL) tablet 975 mg 975 mg Oral Given Desire Marin RN     04/02/2018 1609 clindamycin (CLEOCIN) IVPB (premix) 600 mg 0 mg Intravenous Stopped Clinton Mckeon RN     04/02/2018 1540 clindamycin (CLEOCIN) IVPB (premix) 600 mg 600 mg Intravenous New Bag Desire Marin RN          Code Status: Level 1 - Full Code  VTE Pharmacologic Prophylaxis: Xarelto  VTE Mechanical Prophylaxis: sequential compression device  Admission Status: OBSERVATION    Admission Time  I spent 1 hour admitting the patient  This involved direct patient contact where I performed a full history and physical, reviewing previous records, and reviewing laboratory and other diagnostic studies      Jose Ramon Dangelo MD  Internal Medicine  PGY-1

## 2018-04-02 NOTE — ED PROVIDER NOTES
History  Chief Complaint   Patient presents with    Arm Swelling     Started new medication and left arm swollen and painful x 3 datys  Also has history of clots and is on a blood thinner as well     58-year-old male past medical history of schizophrenia, PE currently on Xarelto and Abilify presents for evaluation of left wrist swelling of unknown duration of time  Patient states that approximately 1 week ago he restarted his Abilify and feels that since then his wrist has been getting worse with intermittent pain and swelling  He states that 2 days ago he had some wrist pain for which he took [address] something or other" that his uncle gave him but it is unclear if it helped the pain at all  This morning he woke up with what he describes as severe localize left wrist pain and swelling and it hurts him more to move his fingers and there are no relieving factors  No similar symptoms in the past     Denies any systemic symptoms including fevers, chills, nausea, vomiting, diarrhea  Denies any chest pain or shortness of breath  He has been compliant with his medications  Prior to Admission Medications   Prescriptions Last Dose Informant Patient Reported? Taking? ARIPiprazole (ABILIFY) 5 mg tablet 4/1/2018 at Unknown time  No Yes   Sig: Take 1 tablet (5 mg total) by mouth daily   rivaroxaban (XARELTO) 15 mg tablet   No No   Sig: Take 1 tablet (15 mg total) by mouth 2 (two) times a day with meals for 21 days   rivaroxaban (XARELTO) 20 mg tablet 4/1/2018 at Unknown time  No Yes   Sig: Take 1 tablet (20 mg total) by mouth daily with breakfast      Facility-Administered Medications: None       Past Medical History:   Diagnosis Date    DVT (deep venous thrombosis) ([facility] [place] [facility] )     Pulmonary embolism ([facility] [place] [facility] )     Schizophrenia ([facility] [place] [facility] )        History reviewed  No pertinent surgical history      Family History   Problem Relation Age of Onset    No Known Problems Mother     No Known Problems Father      I have reviewed and agree with the history as documented  Social History   Substance Use Topics    Smoking status: Never Smoker    Smokeless tobacco: Former User    Alcohol use Yes      Comment: former user        Review of Systems   Constitutional: Negative for appetite change, chills and fever  HENT: Negative for rhinorrhea and sore throat  Eyes: Negative for photophobia and visual disturbance  Respiratory: Negative for cough and shortness of breath  Cardiovascular: Negative for chest pain and palpitations  Gastrointestinal: Negative for abdominal pain and diarrhea  Genitourinary: Negative for dysuria, frequency and urgency  Musculoskeletal:        Left wrist swelling and pain   Skin: Negative for rash  Neurological: Negative for dizziness and weakness  All other systems reviewed and are negative  Physical Exam  ED Triage Vitals   Temperature Pulse Respirations Blood Pressure SpO2   04/02/18 0852 04/02/18 0852 04/02/18 0852 04/02/18 0852 04/02/18 0852   98 5 °F (36 9 °C) 95 20 153/85 99 %      Temp Source Heart Rate Source Patient Position - Orthostatic VS BP Location FiO2 (%)   04/02/18 0852 04/02/18 0852 04/02/18 1454 04/02/18 1454 --   Oral Monitor Lying Right arm       Pain Score       04/02/18 0852       Worst Possible Pain           Orthostatic Vital Signs  Vitals:    04/03/18 0735 04/03/18 1530 04/04/18 0135 04/04/18 0720   BP: 120/61 108/55 137/64 128/60   Pulse: 81 84 98 (!) 107   Patient Position - Orthostatic VS: Lying Lying Lying Lying       Physical Exam   Constitutional: He is oriented to person, place, and time  He appears well-developed and well-nourished  HENT:   Head: Normocephalic and atraumatic  Right Ear: External ear normal    Left Ear: External ear normal    Mouth/Throat: Oropharynx is clear and moist    Eyes: Conjunctivae and EOM are normal  Pupils are equal, round, and reactive to light  Neck: Normal range of motion  Neck supple  No JVD present   No tracheal deviation present  Cardiovascular: Normal rate, regular rhythm and normal heart sounds  Exam reveals no gallop and no friction rub  No murmur heard  Pulmonary/Chest: Effort normal  No stridor  No respiratory distress  He has no wheezes  He has no rales  Abdominal: Soft  He exhibits no distension and no mass  There is no tenderness  There is no rebound and no guarding  Musculoskeletal: He exhibits no edema  Localized swelling of the left wrist, tender to palpation with decreased range of motion secondary to pain, patient is able to move his fingers distally but states that it hurts him too much to do so  Sensation intact in fingers distally  The wrist is symmetrically warm w/ opposite wrist  No UE Swelling or pain  Neurological: He is alert and oriented to person, place, and time  No cranial nerve deficit  Skin: Skin is warm and dry  No rash noted  No erythema  No pallor  Psychiatric: He has a normal mood and affect  Nursing note and vitals reviewed        ED Medications  Medications   ketorolac (TORADOL) injection 15 mg (15 mg Intramuscular Given 4/2/18 0944)   acetaminophen (TYLENOL) tablet 975 mg (975 mg Oral Given 4/2/18 0920)   clindamycin (CLEOCIN) IVPB (premix) 600 mg (0 mg Intravenous Stopped 4/2/18 1609)       Diagnostic Studies  Results Reviewed     Procedure Component Value Units Date/Time    Sedimentation rate, automated [[id number]]  (Abnormal) Collected:  04/02/18 0943    Lab Status:  Final result Specimen:  Blood from Arm, Right Updated:  04/02/18 1208     Sed Rate 37 (H) mm/hour     Basic metabolic panel [[id number]] Collected:  04/02/18 0943    Lab Status:  Final result Specimen:  Blood from Arm, Right Updated:  04/02/18 1022     Sodium 138 mmol/L      Potassium 3 9 mmol/L      Chloride 107 mmol/L      CO2 25 mmol/L      Anion Gap 6 mmol/L      BUN 7 mg/dL      Creatinine 1 03 mg/dL      Glucose 81 mg/dL      Calcium 9 1 mg/dL      eGFR 110 ml/min/1 73sq m     Narrative: National Kidney Disease Education Program recommendations are as follows:  GFR calculation is accurate only with a steady state creatinine  Chronic Kidney disease less than 60 ml/min/1 73 sq  meters  Kidney failure less than 15 ml/min/1 73 sq  meters  C-reactive protein [[id number]]  (Abnormal) Collected:  04/02/18 0943    Lab Status:  Final result Specimen:  Blood from Arm, Right Updated:  04/02/18 1022     CRP 38 4 (H) mg/L     CBC and differential [[id number]]  (Abnormal) Collected:  04/02/18 0943    Lab Status:  Final result Specimen:  Blood from Arm, Right Updated:  04/02/18 1002     WBC 7 86 Thousand/uL      RBC 4 86 Million/uL      Hemoglobin 12 3 g/dL      Hematocrit 37 5 %      MCV 77 (L) fL      MCH 25 3 (L) pg      MCHC 32 8 g/dL      RDW 14 2 %      MPV 9 3 fL      Platelets 530 Thousands/uL      nRBC 0 /100 WBCs      Neutrophils Relative 52 %      Lymphocytes Relative 24 %      Monocytes Relative 18 (H) %      Eosinophils Relative 5 %      Basophils Relative 1 %      Neutrophils Absolute 4 11 Thousands/µL      Lymphocytes Absolute 1 88 Thousands/µL      Monocytes Absolute 1 45 (H) Thousand/µL      Eosinophils Absolute 0 36 Thousand/µL      Basophils Absolute 0 04 Thousands/µL                  VAS upper limb venous duplex scan, unilateral/limited   Final Result by Julia Sandoval MD (04/02 1816)      XR wrist 3+ views LEFT   Final Result by Bill Sotelo DO (04/02 1048)      No acute osseous abnormality  Mild dorsal soft tissue swelling              Workstation performed: [id number]               Procedures  Procedures      Phone Consults  ED Phone Contact    ED Course  ED Course as of Apr 04 2143 Mon Apr 02, 2018   1129 On evaluation patient resting comfortably however when I woke him up to recheck his pain level every time I touch his wrist he complains of pain and    [place] resident and spoke to Sam Quevedo not on hand call today, advised to call physician on hand call for further evaluation    1300 Spoke to Dr Tay Casanova, hand surgeon on call discussed the patient's workup so far as well as lab work and x-ray results, states that this possibly represents inflammatory changes and could potentially start patient on Medrol Dosepak, order outpatient MRI and have him follow up with the office for further care    22 [id number] On evaluation looked at the patient's wrist with ultrasound, patient with some cobblestoning and minimal joint effusion, patient continues to have wrist pain despite analgesia and refuses to move the wrist will start patient on antibiotics and admit for further care                                MDM  Number of Diagnoses or Management Options  Diagnosis management comments: Given patient's history of DVTs and PE will obtain venous duplex of left upper extremity to evaluate for DVT, will obtain lab work including CBC, BMP, ESR, CRP to evaluate for inflammatory arthropathy, will obtain x-ray to evaluate for bony abnormalities, will treat symptomatically with analgesics and re-evaluate    CritCare Time    Disposition  Final diagnoses:   Wrist swelling, left   Cellulitis of left hand   Wrist pain, acute, left     Time reflects when diagnosis was documented in both MDM as applicable and the Disposition within this note     Time User Action Codes Description Comment    4/2/2018  9:35 AM Chaz Sours M Add [R60 9] Edema     4/2/2018  9:35 AM Chaz Sours M Add [R60 9] Swelling     4/2/2018  2:21 PM Merilynn Cocker Add [M25 432] Wrist swelling, left     4/2/2018  2:21 PM Merilynn Cocker Add [L03 90] Cellulitis     4/2/2018  2:21 PM Merilynn Cocker Remove [L03 90] Cellulitis     4/2/2018  2:21 PM Merilynn Cocker Add [L03 114] Cellulitis of left hand     4/2/2018  2:21 PM Merilynn Cocker Add [M25 532] Wrist pain, acute, left     4/2/2018  5:52 PM Crissy Creamer Add [M25 532,  M25 432] Pain and swelling of left wrist       ED Disposition     ED Disposition Condition Comment    Admit  Case was discussed with JEET and the patient's admission status was agreed to be Admission Status: observation status to the service of Dr Randall Solitario   Follow-up Information     Follow up With Specialties Details Why 46James Quijano [facility], DO Internal Medicine Follow up Please follow up at the clinic in 1 week to establish care  [address] [facility]  [phone]          Discharge Medication List as of 4/4/2018 11:28 AM      CONTINUE these medications which have NOT CHANGED    Details   ARIPiprazole (ABILIFY) 5 mg tablet Take 1 tablet (5 mg total) by mouth daily, Starting Sat 3/24/2018, Print      rivaroxaban (XARELTO) 20 mg tablet Take 1 tablet (20 mg total) by mouth daily with breakfast, Starting Sun 3/11/2018, Print             Outpatient Discharge Orders  Ambulatory referral to Occupational Therapy   Standing Status: Future  Standing Exp  Date: 10/04/18     Discharge Diet     Activity as tolerated         ED Provider  Attending physically available and evaluated Chetan Fuentes I managed the patient along with the ED Attending      Electronically Signed by         Paula Kruger MD  04/04/18 8026

## 2018-04-02 NOTE — ED ATTENDING ATTESTATION
Carrie Cagle MD, saw and evaluated the patient  I have discussed the patient with the resident/non-physician practitioner and agree with the resident's/non-physician practitioner's findings, Plan of Care, and MDM as documented in the resident's/non-physician practitioner's note, except where noted  All available labs and Radiology studies were reviewed  At this point I agree with the current assessment done in the Emergency Department  I have conducted an independent evaluation of this patient a history and physical is as follows:      Critical Care Time  CritCare Time  This is an evaluation of a 43-year-old male who presents to the emergency department complaining of left wrist pain x3 days  Patient states that over the past 3 days he has had progressive swelling and pain in his wrist with radiation into his left hand  Patient does complain of some intermittent paresthesias and pain worsens with any type of movement of fingers or wrist   He denies any trauma or falls  Patient denies any associated fevers or chills  He denies any history of the same  Only new change over the past week has been re-initiation of his Abilify  Patient denies any drug use or injection of drugs  Patient does have a psychiatric history and had a drug screen performed within the last month which was negative for all substances with the exception of THC  On physical exam patient is cooperative but uncomfortable appearing  Mildly hypertensive otherwise vital signs stable  HEENT is normocephalic and atraumatic with clear sclera and conjunctiva and moist mucous membranes  Neck is supple  Heart is regular rate  Lungs are clear to auscultation bilaterally  Abdomen is soft with positive bowel sounds  With exception of left wrist and hand moving all extremities equally  Patient does have intact sensation to left hand and digits  He has capillary refill that is less than 2 seconds    He has ache to wiggle his fingers  He has intact radial pulses  He does have some swelling to dorsal hand but otherwise soft compartments and I am able to flip over at [facility]  Patient does have decreased range of motion of his wrist secondary to pain  There is no warmth or erythema appreciated  Assessment and plan left wrist hand swelling     Will obtain basic blood work including CRP and ESR  Will obtain imaging as well as vascular study as patient also does have a history of PEs  Will treat accordingly  Portions of the record may have been created with voice recognition software  Occasional wrong word or sound-a-like" substitutions may have occurred due to the inherent limitations of voice recognition software  Review chart carefully and recognize, using context, where substitutions have occurred    Procedures

## 2018-04-02 NOTE — PROGRESS NOTES
[facility] Senior Admission Note   Unit/Bed # @DBLINK (UTL,72145)@ Encounter: [id number]  SOD Team A          Everett Evans 34 y o  male [id number]       Patient seen and examined  Reviewed H&P per Dr Josselyn Winslow   Agree with the assessment and plan unless otherwise noted       Assessment/Plan: Principal Problem:    Pain and swelling of left wrist  Active Problems:    Pulmonary embolism (HCC)    Marijuana abuse    Schizophrenia (HCC)     Plan:   -Consult ortho for joint aspiration   -Will hold antibiotics at this time, not erythematous, afebrile, no elevated WBC   -Pain control with Toradol and Tylenol   -Continue Xarelto, unsure why patient did not have thrombus panel after two incidences   -Continue Abilify     Disposition:  OBSERVATION    Expected LOS: <2 9 Estefanía Molina DO

## 2018-04-03 LAB
ALBUMIN SERPL BCP-MCNC: 3.2 G/DL (ref 3.5–5)
ALP SERPL-CCNC: 52 U/L (ref 46–116)
ALT SERPL W P-5'-P-CCNC: 13 U/L (ref 12–78)
ANION GAP SERPL CALCULATED.3IONS-SCNC: 7 MMOL/L (ref 4–13)
AST SERPL W P-5'-P-CCNC: 13 U/L (ref 5–45)
BASOPHILS NFR SNV MANUAL: 2 %
BILIRUB SERPL-MCNC: 1.04 MG/DL (ref 0.2–1)
BUN SERPL-MCNC: 10 MG/DL (ref 5–25)
CALCIUM SERPL-MCNC: 9.3 MG/DL (ref 8.3–10.1)
CHLORIDE SERPL-SCNC: 106 MMOL/L (ref 100–108)
CO2 SERPL-SCNC: 26 MMOL/L (ref 21–32)
CREAT SERPL-MCNC: 0.98 MG/DL (ref 0.6–1.3)
EOSINOPHIL NFR SNV MANUAL: 13 %
ERYTHROCYTE [DISTWIDTH] IN BLOOD BY AUTOMATED COUNT: 14.3 % (ref 11.6–15.1)
GFR SERPL CREATININE-BSD FRML MDRD: 117 ML/MIN/1.73SQ M
GLUCOSE SERPL-MCNC: 70 MG/DL (ref 65–140)
HCT VFR BLD AUTO: 39.2 % (ref 36.5–49.3)
HGB BLD-MCNC: 12.4 G/DL (ref 12–17)
HIV 1+2 AB+HIV1 P24 AG SERPL QL IA: NORMAL
HIV1 P24 AG SER QL: NORMAL
LYMPHOCYTES # SNV MANUAL: 3 %
MCH RBC QN AUTO: 25.6 PG (ref 26.8–34.3)
MCHC RBC AUTO-ENTMCNC: 31.6 G/DL (ref 31.4–37.4)
MCV RBC AUTO: 81 FL (ref 82–98)
NEUTROPHILS NFR SNV MANUAL: 82 %
PLATELET # BLD AUTO: 355 THOUSANDS/UL (ref 149–390)
PMV BLD AUTO: 9.9 FL (ref 8.9–12.7)
POTASSIUM SERPL-SCNC: 4 MMOL/L (ref 3.5–5.3)
PROT SERPL-MCNC: 8.3 G/DL (ref 6.4–8.2)
RBC # BLD AUTO: 4.85 MILLION/UL (ref 3.88–5.62)
SODIUM SERPL-SCNC: 139 MMOL/L (ref 136–145)
TOTAL CELLS COUNTED SPEC: 100
URATE SERPL-MCNC: 6.4 MG/DL (ref 4.2–8)
WBC # BLD AUTO: 5.82 THOUSAND/UL (ref 4.31–10.16)

## 2018-04-03 PROCEDURE — 86430 RHEUMATOID FACTOR TEST QUAL: CPT | Performed by: STUDENT IN AN ORGANIZED HEALTH CARE EDUCATION/TRAINING PROGRAM

## 2018-04-03 PROCEDURE — 87591 N.GONORRHOEAE DNA AMP PROB: CPT | Performed by: INTERNAL MEDICINE

## 2018-04-03 PROCEDURE — 87491 CHLMYD TRACH DNA AMP PROBE: CPT | Performed by: INTERNAL MEDICINE

## 2018-04-03 PROCEDURE — 85027 COMPLETE CBC AUTOMATED: CPT | Performed by: STUDENT IN AN ORGANIZED HEALTH CARE EDUCATION/TRAINING PROGRAM

## 2018-04-03 PROCEDURE — 99222 1ST HOSP IP/OBS MODERATE 55: CPT | Performed by: ORTHOPAEDIC SURGERY

## 2018-04-03 PROCEDURE — 80053 COMPREHEN METABOLIC PANEL: CPT | Performed by: STUDENT IN AN ORGANIZED HEALTH CARE EDUCATION/TRAINING PROGRAM

## 2018-04-03 PROCEDURE — 84550 ASSAY OF BLOOD/URIC ACID: CPT | Performed by: STUDENT IN AN ORGANIZED HEALTH CARE EDUCATION/TRAINING PROGRAM

## 2018-04-03 PROCEDURE — 86038 ANTINUCLEAR ANTIBODIES: CPT | Performed by: STUDENT IN AN ORGANIZED HEALTH CARE EDUCATION/TRAINING PROGRAM

## 2018-04-03 PROCEDURE — 86200 CCP ANTIBODY: CPT | Performed by: STUDENT IN AN ORGANIZED HEALTH CARE EDUCATION/TRAINING PROGRAM

## 2018-04-03 PROCEDURE — 86039 ANTINUCLEAR ANTIBODIES (ANA): CPT | Performed by: STUDENT IN AN ORGANIZED HEALTH CARE EDUCATION/TRAINING PROGRAM

## 2018-04-03 PROCEDURE — 87806 HIV AG W/HIV1&2 ANTB W/OPTIC: CPT | Performed by: INTERNAL MEDICINE

## 2018-04-03 RX ORDER — OXYCODONE HYDROCHLORIDE 5 MG/1
5 TABLET ORAL EVERY 6 HOURS PRN
Status: DISCONTINUED | OUTPATIENT
Start: 2018-04-03 | End: 2018-04-04 | Stop reason: HOSPADM

## 2018-04-03 RX ADMIN — IBUPROFEN 400 MG: 400 TABLET, FILM COATED ORAL at 12:14

## 2018-04-03 RX ADMIN — ARIPIPRAZOLE 5 MG: 5 TABLET ORAL at 11:00

## 2018-04-03 RX ADMIN — RIVAROXABAN 20 MG: 20 TABLET, FILM COATED ORAL at 11:00

## 2018-04-03 NOTE — PROGRESS NOTES
IM Residency Progress Note   Unit/Bed#: PPHP 823-01 Encounter: [id number]  SOD Team A      Vaibhav Albert 34 y o  male [address] Stay Days: 0    Assessment/Plan:    Principal Problem:    Pain and swelling of left wrist  Active Problems:    Pulmonary embolism (HCC)    Marijuana abuse    Schizophrenia (HCC)       Left wrist pain and swelling  -Onset 3 days prior to admission with no inciting event or trauma  Decreased active ROM and significant tenderness with passive ROM of the wrist and fingers  -XR L wrist on 4/2 shows dorsal tissue swelling, no osseous abnormality  -UE duplex on 4/2 shows no acute DVT or superficial thrombophlebitis  -ESR 37  CRP 38   -F/u NADER, RF, anti-CCP, uric acid  -F/u blood cultures from 4/2   -Ortho consulted, performed joint aspiration  Negative gram stain, no crystals seen 1670 WBC, 1 2 million RBC  They recommended NWB LUE and a volar slab splint    -At this point, the differential is broad and includes inflammatory/reactive arthritis, autoimmune disease   -Pain control with tylenol 650mg po q6h prn for mild and moderate pain, ibuprofen 600mg q6h for severe and breakthrough pain      History of Pulmonary embolism  -As per chart review, he was hospitalized twice for PE  Details of first hospitalization 3 years ago unknown  Second hospitalization in February 2018 after patient was on a flight and was no longer on xarelto  -Continue Xarelto 20mg po daily and SCDs      Schizophrenia  -Stable  -Continue Abilify 5mg po daily   -Patient follows up with a psychiatrist as an outpatient  Disposition: continue med surg care pending culture and gram stain    Subjective:   He took one motrin overnight for pain  Otherwise no acute events  His pain has improved after his wrist was placed in a splint by ortho   He is tolerating diet, ambulating, voiding w/o difficulties    Hospital Course:  Yennifer Breaker is a 34 y o  male with PMH of PE on Xarelto, schizophrenia on abilify who presented on  with a 3 day history of worsening left wrist pain with radiation into the hand  The pain is worse with movement of the fingers or wrist but is not associated with numbness or tingling  He does not recall any inciting event, trauma or other injury to the area  No recent insect bite or wound to the region  The only change in the past week was re-starting his abilify which he had not been taking for over 5 months  Otherwise he denied any fevers, chills, rash       In the ED, he was mildly hypertensive to 150/80  Labwork remarkable for CRP 38  XR wrist showed mild dorsal soft tissue swelling w/o acute osseous abnormality  UE duplex showed no evidence of DVT or superficial thrombophlebitis  He received a dose of toradol for pain and clindamycin and was subsequently admitted to SOD observation for further management  Orthopedics evaluated patient and performed a joint aspiration  He was also given a wrist splint  Blood cultures were sent off  Vitals: Temp (24hrs), Av 4 °F (36 9 °C), Min:97 8 °F (36 6 °C), Max:98 7 °F (37 1 °C)  Current: Temperature: 98 7 °F (37 1 °C)  Vitals:    18 1554 18 1635 18 2300 18 0735   BP: 132/63 136/64 127/61 120/61   BP Location: Right arm Right arm Right arm Right arm   Pulse: 85 76 70 81   Resp:  16 16 18   Temp:  98 7 °F (37 1 °C) 97 8 °F (36 6 °C) 98 7 °F (37 1 °C)   TempSrc:  Oral Oral Oral   SpO2: 98% 98% 97% 97%   Weight:  109 kg (239 lb 6 7 oz)     Height:  5' 7" (1 702 m)      Body mass index is 37 5 kg/m²  Physical Exam:  General: lying in bed in no acute distress  HEENT: nc/at, eomi  Cardiovascular: regular rate and rhythm w/o murmurs  Pulmonary: cta b/l  Abdominal: soft, nontender  Extremities: LUE wrapped in splint  Mildly tender to palpation over dorsal wrist   Skin: warm and dry  Neuro: no focal deficits    Intake and Outputs:  I/O last 24 hours:   In: 150 [P O :100; IV Piggyback:50]  Out: -   Nutrition:        Diet Orders Start     Ordered    04/02/18 1725  Diet Regular; Regular House  Diet effective now     Question Answer Comment   Diet Type Regular    Regular Regular House    RD to adjust diet per protocol? Yes        04/02/18 1731        Invasive lines and devices: Invasive Devices     Peripheral Intravenous Line            Peripheral IV 04/02/18 Right Antecubital less than 1 day                 Labs:     Results from last 7 days  Lab Units 04/03/18  0451 04/02/18  0943   WBC Thousand/uL 5 82 7 86   HEMOGLOBIN g/dL 12 4 12 3   HEMATOCRIT % 39 2 37 5   PLATELETS Thousands/uL 355 375   NEUTROS PCT %  --  52   MONOS PCT %  --  18*       Results from last 7 days  Lab Units 04/03/18  0451 04/02/18  0943   SODIUM mmol/L 139 138   POTASSIUM mmol/L 4 0 3 9   CHLORIDE mmol/L 106 107   CO2 mmol/L 26 25   BUN mg/dL 10 7   CREATININE mg/dL 0 98 1 03   CALCIUM mg/dL 9 3 9 1   TOTAL PROTEIN g/dL 8 3*  --    BILIRUBIN TOTAL mg/dL 1 04*  --    ALK PHOS U/L 52  --    ALT U/L 13  --    AST U/L 13  --    GLUCOSE RANDOM mg/dL 70 81         No results found for: PHOS         0  Lab Value Date/Time   TROPONINI 0 13 (H) 02/17/2018 0705   TROPONINI 0 19 (H) 02/16/2018 1722         ABG:No results found for: PHART, DIM4FOR, PO2ART, DIE4HDD, C5XMSJUA, BEART, SOURCE  Imaging: I have personally reviewed pertinent reports  Xr Wrist 3+ Views Left    Result Date: 4/2/2018  Impression: No acute osseous abnormality  Mild dorsal soft tissue swelling   Workstation performed: [id number]     EKG:   Micro:  No results found for: Maria Esther Restorationist, WOUNDCULT, SPUTUMCULTUR  Allergies: No Known Allergies  Medications:   Scheduled Meds:  Current Facility-Administered Medications:  ARIPiprazole 5 mg Oral Daily Russ Jacobson MD   ibuprofen 400 mg Oral Q6H PRN Russ Jacobson MD   ketorolac 15 mg Intravenous Q6H PRN Russ Jacobson MD   rivaroxaban 20 mg Oral Daily With Breakfast Russ Jacobson MD     Continuous Infusions:   PRN Meds:    ibuprofen 400 mg Q6H PRN ketorolac 15 mg Q6H PRN       VTE Pharmacologic Prophylaxis: Heparin  VTE Mechanical Prophylaxis: sequential compression device    Deepa Mccarty MD

## 2018-04-03 NOTE — CASE MANAGEMENT
Initial Clinical Review    Admission: Date/Time/Statement: OBSERVATION 4/2/18 @ 1423    Orders Placed This Encounter   Procedures    Place in Observation     Standing Status:   Standing     Number of Occurrences:   1     Order Specific Question:   Admitting Physician     Answer:   Shaun Nesbitt     Order Specific Question:   Level of Care     Answer:   Med Surg [16]     ED: Date/Time/Mode of Arrival:   ED Arrival Information     Expected Arrival Acuity Means of Arrival Escorted By Service Admission Type    - 4/2/2018 08:41 Emergent Walk-In Self General Medicine Emergency    Arrival Complaint    Hand pain        Chief Complaint:   Chief Complaint   Patient presents with    Arm Swelling     Started new medication and left arm swollen and painful x 3 datys  Also has history of clots and is on a blood thinner as well     History of Illness: Everett Evans is a 34 y o  male with PMH of PE on Xarelto, schizophrenia on abilify who presented on 4/2 with a 3 day history of worsening left wrist pain with radiation into the hand  The pain is worse with movement of the fingers or wrist but is not associated with numbness or tingling  He does not recall any inciting event, trauma or other injury to the area  No recent insect bite or wound to the region  The only change in the past week was re-starting his abilify which he had not been taking for over 5 months  Otherwise he denied any fevers, chills, rash  In the ED, he was mildly hypertensive to 150/80  Labwork remarkable for CRP 38  XR wrist showed mild dorsal soft tissue swelling w/o acute osseous abnormality  UE duplex showed no evidence of DVT or superficial thrombophlebitis  He received a dose of toradol for pain and clindamycin and was subsequently admitted to SOD observation for further management       ED Vital Signs:   ED Triage Vitals   Temperature Pulse Respirations Blood Pressure SpO2   04/02/18 0852 04/02/18 7064 04/02/18 9034 04/02/18 1191 04/02/18 5416 98 5 °F (36 9 °C) 95 20 153/85 99 %      Temp Source Heart Rate Source Patient Position - Orthostatic VS BP Location FiO2 (%)   04/02/18 0852 04/02/18 0852 04/02/18 1454 04/02/18 1454 --   Oral Monitor Lying Right arm       Pain Score       04/02/18 0852       Worst Possible Pain        Wt Readings from Last 1 Encounters:   04/02/18 109 kg (239 lb 6 7 oz)     Abnormal Labs: Total Protein       4/3/18 6 4 - 8 2 g/dL 8 3     Albumin 3 5 - 5 0 g/dL 3 2     Total Bilirubin 0 20 - 1 00 mg/dL 1 04     ESR 37  C REACTIVE PROTEIN 38 4  Snyovial fluid WBC 1670  Snyovial fluid RBC 1,260,000  Blood and snyovial fluid cultures pending     Diagnostic Test Results:     4/2 BUE Venous duplex - WNL     4/2 Xray L wrist - No acute osseous abnormality  Mild dorsal soft tissue swelling  ED Treatment:   Medication Administration from 04/02/2018 0841 to 04/02/2018 4263    Date/Time Order Dose Route Action Action by Comments   04/02/2018 0920 acetaminophen (TYLENOL) tablet 975 mg 975 mg Oral Given Ella Ramos RN    04/02/2018 1540 clindamycin (CLEOCIN) IVPB (premix) 600 mg 600 mg Intravenous New Bag Ella Ramos RN         Past Medical/Surgical History:    Active Ambulatory Problems     Diagnosis Date Noted    Pulmonary embolism ([facility] [address] ) 02/16/2018    Marijuana abuse 02/16/2018    [facility]) ([facility])      Resolved Ambulatory Problems     Diagnosis Date Noted    Chest pain 02/16/2018     Past Medical History:   Diagnosis Date    DVT (deep venous thrombosis) (HCC)     Pulmonary embolism (HCC)     Schizophrenia (HCC)      Admitting Diagnosis: Edema [R60 9]  Swelling [R60 9]  Hand pain [M79 643]  Cellulitis of left hand [L03 114]  Wrist swelling, left [M25 432]  Wrist pain, acute, left [M25 532]    Age/Sex: 34 y o  male    Assessment/Plan:    Principal Problem:    Pain and swelling of left wrist  Active Problems:    Pulmonary embolism (HCC)    Marijuana abuse    Schizophrenia (HCC)     Left wrist pain and swelling  -Onset 3 days prior to admission with no inciting event or trauma  Decreased active ROM and significant tenderness with passive ROM of the wrist and fingers    -At this point, the differential is broad and includes gout, pseudogout, septic arthritis, cellulitis, dactylitis  -XR L wrist on 4/2 shows dorsal tissue swelling, no osseous abnormality  -UE duplex on 4/2 shows no acute DVT or superficial thrombophlebitis  -F/u blood cultures from 4/2   -Ortho consulted for possible joint aspiration   -Pain control with tylenol 650mg po q6h prn for mild and moderate pain, ibuprofen 600mg q6h for severe and breakthrough pain      History of Pulmonary embolism  -Continue Xarelto 20mg po daily and SCDs      Schizophrenia  -Stable  -Continue Abilify 5mg po daily   -Patient follows up with a psychiatrist as an outpatient  Admission Orders:  Scheduled Meds:   Current Facility-Administered Medications:  ARIPiprazole 5 mg Oral Daily Swapna Chau MD   ibuprofen 400 mg Oral Q6H PRN Swapna Chau MD   ketorolac 15 mg Intravenous Q6H PRN Swapna Chau MD   rivaroxaban 20 mg Oral Daily With Breakfast Swapna Chau MD     PRN Meds: ibuprofen x1    ketorolac    WBAT   Wrist brace   SCDs  OOB as arlen   NADER screen  Uric acid   Rapid HIV  Diet regular  Cons Ortho   _________________________  4/2 Ortho Consult   Assessment:  32 y o male left wrist swelling s/p left wrist aspiration     Plan:   · NWB left upper extremity and volar slab splint  · Follow-up aspiration labs  · PT/OT  · Pain control per primary team  · DVT ppx per primary team  _____________________________  4/3 Ortho Progress Note  Assessment:  32 y o male left wrist swelling s/p left wrist aspiration performed on 4/2     Plan:   · NWB left upper extremity and volar slab splint - UPDATE - Patient can now be transitioned to WBAT in E with removable wrist brace    · Follow-up aspiration labs- negative gram stain thus far  · PT/OT  · Pain control per primary team  · Dispo: Will follow aspiration labs  __________________________  4/3 Medicine Progress Note    Pain and swelling of left wrist  Active Problems:    Pulmonary embolism (HCC)    Marijuana abuse    Schizophrenia (HCC)      Left wrist pain and swelling  -Onset 3 days prior to admission with no inciting event or trauma  Decreased active ROM and significant tenderness with passive ROM of the wrist and fingers  -XR L wrist on 4/2 shows dorsal tissue swelling, no osseous abnormality  -UE duplex on 4/2 shows no acute DVT or superficial thrombophlebitis  -ESR 37  CRP 38   -F/u NADRE, RF, anti-CCP, uric acid  -F/u blood cultures from 4/2   -Ortho consulted, performed joint aspiration  Negative gram stain, no crystals seen 1670 WBC, 1 2 million RBC  They recommended NWB LUE and a volar slab splint    -At this point, the differential is broad and includes inflammatory/reactive arthritis, autoimmune disease   -Pain control with tylenol 650mg po q6h prn for mild and moderate pain, ibuprofen 600mg q6h for severe and breakthrough pain      History of Pulmonary embolism  -As per chart review, he was hospitalized twice for PE  Details of first hospitalization 3 years ago unknown  Second hospitalization in February 2018 after patient was on a flight and was no longer on xarelto  -Continue Xarelto 20mg po daily and SCDs      Schizophrenia  -Stable     -Continue Abilify 5mg po daily   -Patient follows up with a psychiatrist as an outpatient      Disposition: continue med surg care pending culture and gram stain

## 2018-04-03 NOTE — SOCIAL WORK
CM met with patient this morning to discuss discharge plans  Patient states that he lives in an house  There are 3steps to enter and 13 to getupstairs to bedroom and bathroom  Prior to admission pt was independent all ADL"S    Patient denies any DME'c   VNA, or  any snf   Pt has history of mental health  And sees a psychiatrist outpatient , denies [facility] rehab in the past    Pt denies having a pharamcy just moved here from [place]   Cm educated [facility] on [facility]      Patient denies needs from   When medically clear Earnie Meeter [phone]  will transport home   CM reviewed d/c planning process including the following: identifying help at home, patient preference for d/c planning needs, Discharge Lounge, [facility], availability of treatment team to discuss questions or concerns patient and/or family may have regarding understanding medications and recognizing signs and symptoms once discharged   CM also encouraged patient to follow up with all recommended appointments after discharge  Patient advised of importance for patient and family to participate in managing patients medical well being  Discharge checklist discussed with patient and family

## 2018-04-03 NOTE — CONSULTS
Orthopedics   Vaibhav Dixon 34 y o  male MRN: [id number]  Unit/Bed#: [facility] 823-01      Chief Complaint:   Left wrist pain    HPI:  34 y o male complaining of left dorsal wrist pain that began insidiously about 2 days ago  He denies any injury, recent sexual activity  He is right handed  Located over the dorsal aspect of the left wrist   Does not radiate  Pain is worse with motion to the wrist  He denies any numbness or tingling  He denies any alleviating factors        Review Of Systems:   · Skin:  Intact  · Neuro: See HPI  · Musculoskeletal: See HPI  · 14 point review of systems negative except as stated above     Past Medical History:   Past Medical History:   Diagnosis Date    [facility]     Pulmonary embolism (HCC)     Schizophrenia ([place] [address] )        Past Surgical History:   History reviewed  No pertinent surgical history      Family History:  Family history reviewed and non-contributory  Family History   Problem Relation Age of Onset    No Known Problems Mother     No Known Problems Father        Social History:  Social History     Social History    Marital status: Single     Spouse name: N/A    Number of children: N/A    Years of education: N/A     Social History Main Topics    Smoking status: Never Smoker    Smokeless tobacco: Former User    Alcohol use Yes      Comment: former user    Drug use: Yes     Types: Marijuana    Sexual activity: Not Asked     Other Topics Concern    None     Social History Narrative    None       Allergies:   No Known Allergies        Labs:    0  Lab Value Date/Time   HCT 37 5 04/02/2018 0943   HCT 35 4 (L) 02/17/2018 0236   HCT 37 6 02/16/2018 1722   HGB 12 3 04/02/2018 0943   HGB 11 6 (L) 02/17/2018 0236   HGB 12 5 02/16/2018 1722   INR 1 11 02/16/2018 1945   WBC 7 86 04/02/2018 0943   WBC 7 00 02/17/2018 0236   WBC 7 60 02/16/2018 1722   ESR 37 (H) 04/02/2018 0943   CRP 38 4 (H) 04/02/2018 0943       Meds:    Current Facility-Administered Medications:     [START ON 4/3/2018] ARIPiprazole (ABILIFY) tablet 5 mg, 5 mg, Oral, Daily, Deonna Hardy MD    docusate sodium (COLACE) capsule 100 mg, 100 mg, Oral, BID PRN, Deonna Hardy MD    hydrALAZINE (APRESOLINE) injection 5 mg, 5 mg, Intravenous, Q6H PRN, Deonna Hardy MD    ibuprofen (MOTRIN) tablet 400 mg, 400 mg, Oral, Q6H PRN, Deonna Hardy MD    ketorolac (TORADOL) injection 15 mg, 15 mg, Intravenous, Q6H PRN, Deonna Hardy MD    ondansetron [facility]) injection 4 mg, 4 mg, Intravenous, Q6H PRN, Deonna Hardy MD    [START ON 4/3/2018] rivaroxaban (XARELTO) tablet 20 mg, 20 mg, Oral, Daily With Breakfast, Deonna Hardy MD    Blood Culture:   No results found for: BLOODCX    Wound Culture:   No results found for: WOUNDCULT    Ins and Outs:  I/O last 24 hours: In: 48 [IV Piggyback:50]  Out: -           Physical Exam:   /64 (BP Location: Right arm)   Pulse 76   Temp 98 7 °F (37 1 °C) (Oral)   Resp 16   Ht 5' 7" (1 702 m)   Wt 109 kg (239 lb 6 7 oz)   SpO2 98%   BMI 37 50 kg/m²   Gen: Alert and oriented to person, place, time  HEENT: EOMI, eyes clear, moist mucus membranes, hearing intact  Respiratory: Bilateral chest rise  No audible wheezing found  Cardiovascular: Regular Rate and Rhythm  Abdomen: soft nontender/nondistended  Musculoskeletal: left upper extremity  · Skin intact  · TTP dorsal radiocarpal joint  · SILT m/r/u    · Motor intact ain/pin/m/r/u,   · 2+ rad pulse    Radiology:   I personally reviewed the films  X-rays of the left wrist show no acute osseous abnormality    Procedure- Orthopedics   Vaibhav Dixon 34 y o  male MRN: [id number]  Unit/Bed#: [facility] 823-01    Procedure: left wrist aspiration    After sterile preparation of the skin overlying the wrist, local anesthetic was provided ethyl chloride spray  An 18 gauge needle was then  inserted distal and ulnar to Fatou's tubercle on the dorsal aspect of the wrist   Approx 1cc of serosanguinous fluid was aspirated   dressing and volar slab splint was then applied  Pt tolerated the procedure well and was neurovascularly intact both pre and post procedure      Gregory Garveyl      _*_*_*_*_*_*_*_*_*_*_*_*_*_*_*_*_*_*_*_*_*_*_*_*_*_*_*_*_*_*_*_*_*_*_*_*_*_*_*_*_*    Assessment:  34 y o male left wrist swelling s/p left wrist aspiration    Plan:   · NWB left upper extremity and volar slab splint  · Follow-up aspiration labs  · PT/OT  · Pain control per primary team  · DVT ppx per primary team  · [address] [email or web address]

## 2018-04-03 NOTE — PROGRESS NOTES
Orthopedics   Vaibhav Dixon 34 y o  male MRN: [id number]  [facility]    S: Resting comfortably this morning  Complaining of minimal pain in left wrist this AM  Denies numbness or tinging  More comfortable in splint  Labs:    0  Lab Value Date/Time   HCT 37 5 04/02/2018 0943   HCT 35 4 (L) 02/17/2018 0236   HCT 37 6 02/16/2018 1722   HGB 12 3 04/02/2018 0943   HGB 11 6 (L) 02/17/2018 0236   HGB 12 5 02/16/2018 1722   INR 1 11 02/16/2018 1945   WBC 7 86 04/02/2018 0943   WBC 7 00 02/17/2018 0236   WBC 7 60 02/16/2018 1722   ESR 37 (H) 04/02/2018 0943   CRP 38 4 (H) 04/02/2018 0943       Meds:    Current Facility-Administered Medications:     ARIPiprazole (ABILIFY) tablet 5 mg, 5 mg, Oral, Daily, Gayathri Heller MD    ibuprofen (MOTRIN) tablet 400 mg, 400 mg, Oral, Q6H PRN, Gayathri Heller MD    ketorolac (TORADOL) injection 15 mg, 15 mg, Intravenous, Q6H PRN, Gayathri Heller MD    rivaroxaban (XARELTO) tablet 20 mg, 20 mg, Oral, Daily With Breakfast, Gayathri Heller MD    Blood Culture:   No results found for: BLOODCX    Wound Culture:   No results found for: WOUNDCULT    Ins and Outs:  I/O last 24 hours: In: 150 [P O :100; IV Piggyback:50]  Out: 250 [Urine:250]          Physical Exam:   /61 (BP Location: Right arm)   Pulse 70   Temp 97 8 °F (36 6 °C) (Oral)   Resp 16   Ht 5' 7" (1 702 m)   Wt 109 kg (239 lb 6 7 oz)   SpO2 97%   BMI 37 50 kg/m²   Gen: Alert and oriented to person, place, time  Musculoskeletal: left upper extremity  · Splint CDI, Skin intact  · Minimally TTP over dorsal radiocarpal joint  · SILT m/r/u    · Moving fingers freely in splint   · Brisk cap refill     Radiology:   I personally reviewed the films    X-rays of the left wrist show no acute osseous abnormality        _*_*_*_*_*_*_*_*_*_*_*_*_*_*_*_*_*_*_*_*_*_*_*_*_*_*_*_*_*_*_*_*_*_*_*_*_*_*_*_*_*    Assessment:  32 y o male left wrist swelling s/p left wrist aspiration performed on 4/2    Plan:   · NWB left upper extremity and volar slab splint  · Follow-up aspiration labs- negative gram stain thus far  · PT/OT  · Pain control per primary team  · Dispo:  Will follow aspiration labs    Harjeet Mayo MD

## 2018-04-03 NOTE — ORTHOTIC NOTE
Orthotic Note            Date: 4/3/2018      Patient Name: Jennifer Frame        Time: 12:00pm     Reason for Consult:  Patient Active Problem List   Diagnosis    [facility] [address] )    Marijuana abuse    Schizophrenia ([facility] [address] )    [facility]    Pain and swelling of left wrist   LUE Breg Wrist Lacer   Per Orthopedics    I measured fit and donned LUE Breg Wirst lacer while patient was sitting up in bed  Pt tolerated well without complaint and understand instructions/adjustments  Previous splint removed before application of Wrist Lacer  My contact information and instructions at bedside  I will continue to follow up daily  RN aware  Recommendations:  Please call Mobility Coordinator at ext  [phone] in regards to bracing instruction and/or adjustment  Fernanda Dunham Mobility Coordinator LCFo, LCOF, ASOP R  O T, O B T

## 2018-04-04 VITALS
BODY MASS INDEX: 37.58 KG/M2 | HEART RATE: 107 BPM | TEMPERATURE: 98.3 F | WEIGHT: 239.42 LBS | SYSTOLIC BLOOD PRESSURE: 128 MMHG | DIASTOLIC BLOOD PRESSURE: 60 MMHG | RESPIRATION RATE: 20 BRPM | OXYGEN SATURATION: 97 % | HEIGHT: 67 IN

## 2018-04-04 LAB
ANA HOMOGEN SER QL IF: NORMAL
ANA HOMOGEN TITR SER: NORMAL {TITER}
RHEUMATOID FACT SER QL LA: NEGATIVE
RYE IGE QN: POSITIVE

## 2018-04-04 PROCEDURE — G8989 SELF CARE D/C STATUS: HCPCS

## 2018-04-04 PROCEDURE — 97165 OT EVAL LOW COMPLEX 30 MIN: CPT

## 2018-04-04 PROCEDURE — G8987 SELF CARE CURRENT STATUS: HCPCS

## 2018-04-04 PROCEDURE — G8988 SELF CARE GOAL STATUS: HCPCS

## 2018-04-04 PROCEDURE — 99232 SBSQ HOSP IP/OBS MODERATE 35: CPT | Performed by: INTERNAL MEDICINE

## 2018-04-04 RX ADMIN — RIVAROXABAN 20 MG: 20 TABLET, FILM COATED ORAL at 10:54

## 2018-04-04 RX ADMIN — ARIPIPRAZOLE 5 MG: 5 TABLET ORAL at 10:54

## 2018-04-04 NOTE — PHYSICIAN ADVISOR
Current patient class: Observation  The patient is currently on Hospital Day: 2      The patient was admitted to the hospital at N/A on N/A for the following diagnosis:  Edema [R60 9]  Swelling [R60 9]  Hand pain [M79 643]  Cellulitis of left hand [L03 114]  Wrist swelling, left [M25 432]  Wrist pain, acute, left [M25 532]       There is documentation in the medical record of an expected length of stay of at least 2 midnights  The patient is therefore expected to satisfy the 2 midnight benchmark and given the 2 midnight presumption is appropriate for INPATIENT ADMISSION  Given this expectation of a satisfying stay, CMS instructs us that the patient is most often appropriate for inpatient admission under part A provided medical necessity is documented in the chart  After review of the relevant documentation, labs, vital signs and test results, the patient is appropriate for INPATIENT ADMISSION  Admission to the hospital as an inpatient is a complex decision making process which requires the practitioner to consider the patients presenting complaint, history and physical examination and all relevant testing  With this in mind, in this case, the patient was deemed appropriate for INPATIENT ADMISSION  After review of the documentation and testing available at the time of the admission I concur with this clinical determination of medical necessity  Rationale is as follows: The patient is a 34 yrs old Male who presented to the ED at 4/2/2018  8:54 AM with a chief complaint of Arm Swelling (Started new medication and left arm swollen and painful x 3 datys  Also has history of clots and is on a blood thinner as well) the patient will continue to remain hospitalized for evaluation management of left wrist pain  Currently the patient has been hospitalized for 1 midnight, did have Orthopedic surgery consultation and aspiration of the joint    The patient continues to remain hospitalized and will satisfy the 2 midnight benchmark tonight  Given satisfaction of the benchmark, and continued evaluation with a broad differential, cultures pending, the patient is appropriate for inpatient admission    The patients vitals on arrival were ED Triage Vitals   Temperature Pulse Respirations Blood Pressure SpO2   04/02/18 0852 04/02/18 0852 04/02/18 0852 04/02/18 0852 04/02/18 0852   98 5 °F (36 9 °C) 95 20 153/85 99 %      Temp Source Heart Rate Source Patient Position - Orthostatic VS BP Location FiO2 (%)   04/02/18 0852 04/02/18 0852 04/02/18 1454 04/02/18 1454 --   Oral Monitor Lying Right arm       Pain Score       04/02/18 0852       Worst Possible Pain           Past Medical History:   Diagnosis Date    [facility]     Pulmonary embolism (HCC)     Schizophrenia ([facility] [address] )      History reviewed  No pertinent surgical history          Consults have been placed to:   IP CONSULT TO CASE MANAGEMENT  IP CONSULT TO ORTHOPEDIC SURGERY  IP CONSULT TO CASE MANAGEMENT    Vitals:    04/02/18 1635 04/02/18 2300 04/03/18 0735 04/03/18 1530   BP: 136/64 127/61 120/61 108/55   BP Location: Right arm Right arm Right arm Right arm   Pulse: 76 70 81 84   Resp: 16 16 18 18   Temp: 98 7 °F (37 1 °C) 97 8 °F (36 6 °C) 98 7 °F (37 1 °C) 98 4 °F (36 9 °C)   TempSrc: Oral Oral Oral Oral   SpO2: 98% 97% 97% 95%   Weight: 109 kg (239 lb 6 7 oz)      Height: 5' 7" (1 702 m)          Most recent labs:    Recent Labs      04/03/18   0451   WBC  5 82   HGB  12 4   HCT  39 2   PLT  355   K  4 0   NA  139   CALCIUM  9 3   BUN  10   CREATININE  0 98   AST  13   ALT  13   ALKPHOS  52   BILITOT  1 04*       Scheduled Meds:  Current Facility-Administered Medications:  ARIPiprazole 5 mg Oral Daily Gayathri Heller MD   oxyCODONE 5 mg Oral Q6H PRN Gayathri Heller MD   rivaroxaban 20 mg Oral Daily With Breakfast Gayathri Heller MD     Continuous Infusions:   PRN Meds: oxyCODONE    Surgical procedures (if appropriate):

## 2018-04-04 NOTE — DISCHARGE SUMMARY
[facility] CENTRAL Discharge Summary - [address] 34 y o  male MRN: [id number]    [address] [facility] BE [facility] 8 Room / Bed: [facility] 823/[facility] 823-01 Encounter: [id number]    BRIEF OVERVIEW    Admitting Provider: Lary Rutledge DO  Discharge Provider: Lary Rutledge DO  Primary Care Physician at Discharge: JULIO Kaur     Discharge To:  home    Admission Date: 4/2/2018     Discharge Date: No discharge date for patient encounter  Primary Discharge Diagnosis  Principal Problem:    Pain and swelling of left wrist  Active Problems:    Pulmonary embolism (HCC)    Marijuana abuse    Schizophrenia ([address] )  Resolved Problems:    * No resolved hospital problems  *    Consulting Providers   Any Santiago     Therapeutic Operative Procedures Performed  Joint aspiration of wrist    Diagnostic Procedures Performed  Xr Wrist 3+ Views Left    Result Date: 4/2/2018  Impression: No acute osseous abnormality  Mild dorsal soft tissue swelling   Workstation performed: [id number]       Results from last 7 days  Lab Units 04/03/18  0451 04/02/18  0943   WBC Thousand/uL 5 82 7 86   HEMOGLOBIN g/dL 12 4 12 3   HEMATOCRIT % 39 2 37 5   PLATELETS Thousands/uL 355 375   NEUTROS PCT %  --  52   MONOS PCT %  --  18*       Results from last 7 days  Lab Units 04/03/18  0451 04/02/18  0943   SODIUM mmol/L 139 138   POTASSIUM mmol/L 4 0 3 9   CHLORIDE mmol/L 106 107   CO2 mmol/L 26 25   BUN mg/dL 10 7   CREATININE mg/dL 0 98 1 03   CALCIUM mg/dL 9 3 9 1   TOTAL PROTEIN g/dL 8 3*  --    BILIRUBIN TOTAL mg/dL 1 04*  --    ALK PHOS U/L 52  --    ALT U/L 13  --    AST U/L 13  --    GLUCOSE RANDOM mg/dL 70 81         No results found for: PHOS         0  Lab Value Date/Time   TROPONINI 0 13 (H) 02/17/2018 0705   TROPONINI 0 19 (H) 02/16/2018 1722         ABG:No results found for: PHART, TTI2ANU, PO2ART, SJW1WTS, Q3ADZAKZ, BEART, SOURCE    Discharge Disposition: Home/Self Care  Discharged With Lines: no   Test Results Pending at Discharge: rheumatoid workup, chlamydia, GC PCR    Outpatient Follow-Up  Establish care at [facility]  Outpatient OT treatment   Follow up with consulting providers  none  Active Issues Requiring Follow-up   Left wrist pain    Code Status: Level 1 - Full Code  Advance Directive and Living Will: <no information>  Power of :    POLST:      Medications   Current Discharge Medication List        Current Discharge Medication List      CONTINUE these medications which have NOT CHANGED    Details   ARIPiprazole (ABILIFY) 5 mg tablet Take 1 tablet (5 mg total) by mouth daily  Qty: 30 tablet, Refills: 1    Associated Diagnoses: Schizophrenia (HCC)      rivaroxaban (XARELTO) 20 mg tablet Take 1 tablet (20 mg total) by mouth daily with breakfast  Qty: 30 tablet, Refills: 0    Associated Diagnoses: Other pulmonary embolism with acute cor pulmonale, unspecified chronicity ([facility] )           Current Discharge Medication List          Allergies  No Known Allergies  Discharge Diet: regular diet  Activity restrictions: none    [address] [place] [address] Carlos Dixon is a 34 y  o  male with PMH of PE on Xarelto, schizophrenia on abilify who presented on 4/2 with a 3 day history of worsening left wrist pain with radiation into the hand  The pain is worse with movement of the fingers or wrist but is not associated with numbness or tingling  He does not recall any inciting event, trauma or other injury to the area  No recent insect bite or wound to the region  The only change in the past week was re-starting his abilify which he had not been taking for over 5 months  Otherwise he denied any fevers, chills, rash  In the ED, he was mildly hypertensive to 150/80  Labwork remarkable for CRP 38  XR wrist showed mild dorsal soft tissue swelling w/o acute osseous abnormality  UE duplex showed no evidence of DVT or superficial thrombophlebitis   He received a dose of toradol for pain and clindamycin and was subsequently admitted to SOD observation for further management       Orthopedics evaluated patient and performed a joint aspiration, which was negative for bacteria but consistent with a traumatic tap  No crystals were identified  He was also given a wrist splint which was eventually transitioned to a removable brace  Blood cultures were sent off which returned negative  Rheumatologic labwork returned negative thus far, except for positive NADER with titer of 40  His pain improved during the admission, and was well controlled on motrin  OT evaluated patient and recommended outpatient OT follow up  Patient was medically cleared for discharge on 4/4 with instructions to establish care at the [facility]  He was also provided with a work note, and script for outpatient OT services  Presenting Problem/History of Present Illness  Principal Problem:    Pain and swelling of left wrist  Active Problems:    Pulmonary embolism (HCC)    Marijuana abuse    Schizophrenia ([facility] [address] )  Resolved Problems:    * No resolved hospital problems  *    Left wrist pain and swelling, likely secondary to traumatic hematoma  -XR L wrist on 4/2 shows dorsal tissue swelling, no osseous abnormality  -UE duplex on 4/2 shows no acute DVT or superficial thrombophlebitis     -RF, HIV negative  Uric acid wnl    -Positive NADER with titer 40   -F/u anti-CCP, GC/chlamydia PCR  -Blood cultures from 4/2 show ngtd  -Ortho consulted, performed joint aspiration  Negative gram stain, no crystals seen 1670 WBC, 1 2 million RBC  They recommended NWB LUE and a volar slab splint  -F/u OT consult  He works as a  and uses his hands extensively      History of Pulmonary embolism  -Continue Xarelto 20mg po daily and SCDs      Schizophrenia  -Continue Abilify 5mg po daily   -Patient follows up with a psychiatrist as an outpatient  Discharge Condition: good    Discharge  Statement   I spent 30 minutes discharging the patient   This time was spent on the day of discharge  I had direct contact with the patient on the day of discharge  Additional documentation is required if more than 30 minutes were spent on discharge

## 2018-04-04 NOTE — PROGRESS NOTES
IM Residency Progress Note   Unit/Bed#: [facility] 823-01 Encounter: [id number]  SOD Team A      Vaibhav Marshall 34 y o  male [id number]    Hospital Stay Days: 1    Assessment/Plan:    Principal Problem:    Pain and swelling of left wrist  Active Problems:    Pulmonary embolism (HCC)    Marijuana abuse    Schizophrenia (HCC)       Left wrist pain and swelling  -Onset 3 days prior to admission with no inciting event, although he thinks he may have hit his hand but is unsure  Decreased active ROM and significant tenderness with passive ROM of the wrist and fingers  -XR L wrist on 4/2 shows dorsal tissue swelling, no osseous abnormality  -UE duplex on 4/2 shows no acute DVT or superficial thrombophlebitis  -ESR 37  CRP 38   -RF, HIV negative  Uric acid wnl    -F/u NADER, anti-CCP  -Blood cultures from 4/2 show ngtd  -Ortho consulted, performed joint aspiration  Negative gram stain, no crystals seen 1670 WBC, 1 2 million RBC  They recommended NWB LUE and a volar slab splint    -At this point, the differential is likely traumatic hematoma   -Pain control oxycodone  -F/u OT consult  He works as a  and uses his hands extensively      History of Pulmonary embolism  -As per chart review, he was hospitalized twice for PE  Details of first hospitalization 3 years ago unknown  Second hospitalization in February 2018 after patient was on a flight and was no longer on xarelto  -Continue Xarelto 20mg po daily and SCDs      Schizophrenia  -Stable  -Continue Abilify 5mg po daily   -Patient follows up with a psychiatrist as an outpatient  Disposition: Anticipated discharge today  Subjective:   Pain has improved since yesterday  He was switched from a splint to a removable brace  Otherwise offers no acute complaints  Eating ambulating and voiding well      Hospital Course:  Lazaro Vasquez is a 34 y o  male with PMH of PE on Xarelto, schizophrenia on abilify who presented on 4/2 with a 3 day history of worsening left wrist pain with radiation into the hand  The pain is worse with movement of the fingers or wrist but is not associated with numbness or tingling  He does not recall any inciting event, trauma or other injury to the area  No recent insect bite or wound to the region  The only change in the past week was re-starting his abilify which he had not been taking for over 5 months  Otherwise he denied any fevers, chills, rash       In the ED, he was mildly hypertensive to 150/80  Labwork remarkable for CRP 38  XR wrist showed mild dorsal soft tissue swelling w/o acute osseous abnormality  UE duplex showed no evidence of DVT or superficial thrombophlebitis  He received a dose of toradol for pain and clindamycin and was subsequently admitted to SOD observation for further management  Orthopedics evaluated patient and performed a joint aspiration  He was also given a wrist splint  Blood cultures were sent off  Vitals: Temp (24hrs), Av 4 °F (36 9 °C), Min:98 3 °F (36 8 °C), Max:98 6 °F (37 °C)  Current: Temperature: 98 3 °F (36 8 °C)  Vitals:    18 0735 18 1530 18 0135 18 0720   BP: 120/61 108/55 137/64 128/60   BP Location: Right arm Right arm Right arm Right arm   Pulse: 81 84 98 (!) 107   Resp: 18 18 20 20   Temp: 98 7 °F (37 1 °C) 98 4 °F (36 9 °C) 98 6 °F (37 °C) 98 3 °F (36 8 °C)   TempSrc: Oral Oral Oral Oral   SpO2: 97% 95% 98% 97%   Weight:       Height:        Body mass index is 37 5 kg/m²  Physical Exam:  General: lying in bed in no acute distress  HEENT: perrl, eomi  Injected conjunctiva  Cardiovascular: rrr  Pulmonary: clear bilaterally  Abdominal: obese belly, soft, nontender  Extremities: no pitting edema  LUE in removable brace  Mildly tender and swollen over dorsal wrist  Pain with flexion and extension  ROM improved from yesterday  Skin: warm and dry  Neuro: no focal deficits other than weak wrist flexion/extension    Intake and Outputs:  I/O last 24 hours:   In: 960 [P O :960]  Out: 100 [Urine:100]  Nutrition:        Diet Orders            Start     Ordered    04/02/18 1725  Diet Regular; Regular House  Diet effective now     Question Answer Comment   Diet Type Regular    Regular Regular House    RD to adjust diet per protocol? Yes        04/02/18 1731        Invasive lines and devices: Invasive Devices          No matching active lines, drains, or airways           Labs:     Results from last 7 days  Lab Units 04/03/18  0451 04/02/18  0943   WBC Thousand/uL 5 82 7 86   HEMOGLOBIN g/dL 12 4 12 3   HEMATOCRIT % 39 2 37 5   PLATELETS Thousands/uL 355 375   NEUTROS PCT %  --  52   MONOS PCT %  --  18*       Results from last 7 days  Lab Units 04/03/18  0451 04/02/18  0943   SODIUM mmol/L 139 138   POTASSIUM mmol/L 4 0 3 9   CHLORIDE mmol/L 106 107   CO2 mmol/L 26 25   BUN mg/dL 10 7   CREATININE mg/dL 0 98 1 03   CALCIUM mg/dL 9 3 9 1   TOTAL PROTEIN g/dL 8 3*  --    BILIRUBIN TOTAL mg/dL 1 04*  --    ALK PHOS U/L 52  --    ALT U/L 13  --    AST U/L 13  --    GLUCOSE RANDOM mg/dL 70 81         No results found for: PHOS         0  Lab Value Date/Time   TROPONINI 0 13 (H) 02/17/2018 0705   TROPONINI 0 19 (H) 02/16/2018 1722         ABG:No results found for: PHART, OEO9USM, PO2ART, BQH6RAM, U1SOSKLE, BEART, SOURCE  Imaging: I have personally reviewed pertinent reports  Xr Wrist 3+ Views Left    Result Date: 4/2/2018  Impression: No acute osseous abnormality  Mild dorsal soft tissue swelling   Workstation performed: [id number]     EKG:   Micro:  Lab Results   Component Value Date    BLOODCX No Growth at 24 hrs  04/03/2018    BLOODCX No Growth at 24 hrs  04/03/2018     Allergies: No Known Allergies  Medications:   Scheduled Meds:    Current Facility-Administered Medications:  ARIPiprazole 5 mg Oral Daily Maria Esther Butterfield MD   oxyCODONE 5 mg Oral Q6H PRN Maria Esther Butterfield MD   rivaroxaban 20 mg Oral Daily With Breakfast Maria Esther Butterfield MD     Continuous Infusions:   PRN Meds:    oxyCODONE 5 mg Q6H PRN       VTE Pharmacologic Prophylaxis: Heparin  VTE Mechanical Prophylaxis: sequential compression device    Elif Dove MD

## 2018-04-04 NOTE — DISCHARGE INSTRUCTIONS
Arm Pain   AALIYAH Becerra: Arm Pain  In: Professional Guide to Signs & Symptoms, 6th ed  [address] [facility] [place], [place], 2010  Servando AP, Jerry SM, Hector A, et al: Conservative interventions for treating work-related complaints of the arm, neck or shoulder in adults  [employer] Database of Syst Rev 2013; P7381853-  Werely J & Younger DS: Radiculopathy, Cervical  In: Jasper Hernandez, Julio César HB, Sil AD, eds  The 5-Minute Neurology Consult, 2nd ed  [address] [facility],[address] [place] [address], 2012  [address]: Common musculoskeletal diagnoses of upper and lower extremities in older patients  Eliazar Easley5 J Med 2011; 97(0):108-158  © 2017 [address]  Information is for End User's use only and may not be sold, redistributed or otherwise used for commercial purposes  All illustrations and images included in CareNotes® are the copyrighted property of [employer]  or [address]  The above information is an  only  It is not intended as medical advice for individual conditions or treatments  Talk to your doctor, nurse or pharmacist before following any medical regimen to see if it is safe and effective for you

## 2018-04-04 NOTE — OCCUPATIONAL THERAPY NOTE
[address] Evaluation     Patient Name: Chetan Fuentes  Today's Date: 4/4/2018  Problem List  Patient Active Problem List   Diagnosis    Pulmonary embolism ([facility] )    Marijuana abuse    [facility]    DVT (deep venous thrombosis) (HCC)    Pain and swelling of left wrist     Past Medical History  Past Medical History:   Diagnosis Date    DVT (deep venous thrombosis) ([facility] [address] )     Pulmonary embolism ([facility] )     Schizophrenia ([facility] )       04/04/18 0915   Note Type   Note type Eval only   Restrictions/Precautions   Weight Bearing Precautions Per Order Yes   LUE Weight Bearing Per Order WBAT   Braces or Orthoses Other (Comment)  (Breg wrist lacer )   Other Precautions Pain   Pain Assessment   Pain Assessment 0-10   Pain Score 2   Pain Location Wrist   Pain Orientation Left   Hospital Pain Intervention(s) Emotional support   Response to Interventions tolerated    Home Living   Type of [address] [place] [address] Two level;Bed/bath upstairs  (13 steps to bed and bath)   Bathroom Shower/Tub Tub/shower unit   Bathroom Toilet Standard   Bathroom Accessibility Accessible   Prior Function   Level of [place] Independent with ADLs and functional mobility   Lives With [employer] Help From Family   ADL Assistance Independent   IADLs Independent   Falls in the last 6 months 0   Vocational Full time employment   Lifestyle   Autonomy Pt I with ADLs/IADLs   No DME use   Reciprocal Relationships Pt lives w/ uncle   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 7  [address] [facility] 7  Independent   Bed Mobility   Additional Comments Pt EOB upon arrival   Transfers   Sit to Stand 7  Independent   Stand to Sit 7  Independent   Toilet transfer 7  Independent   Functional Mobility   Functional Mobility 7  Independent   Balance   Static Sitting Good   Dynamic Sitting Good   Static Standing Good   Dynamic Standing Good   Activity Tolerance   Activity Tolerance Patient tolerated treatment well   Nurse Made Aware Okay to see per RN   RUE Assessment   RUE Assessment WFL   LUE Assessment   LUE Assessment X  (In wrist brace  Able to form full fist and full opposition)   Hand Function   Gross Motor Coordination Functional   Fine Motor Coordination Functional   Cognition   Overall Cognitive Status WFL  (hx of schizophrenia, however no cog deficits noted on eval)   Arousal/Participation Alert; Responsive; Cooperative   Attention Within functional limits   Orientation Level Oriented X4   Memory Within functional limits   Following Commands Follows all commands and directions without difficulty   Comments Pt pleasant and agreeable to session  Able to answer all questions appropriately   Assessment   Prognosis Good   Assessment Pt is a 34 y o  male who was admitted to [facility] on 4/2/2018 with Pain and swelling of left wrist  X-ray of wrist (-)  Pt presented in Breg Wrist Lacer brace upon eval and is WBAT LUE  PMH includes pulmonary embolism and schizophrenia  At baseline pt I with ADLs and IADLS  Works w/ uncle  Pt lives w/ uncle in 2 story home w/ bed and bath upstairs  Currently pt I for overall ADLS and I for functional mobility/transfers  Pt reports he is functioning at his baseline w/ exception of mild wrist pain, however pt reports hand/wrist ROM and pain has improved since admission  Educated pt on outpatient hand therapy and spoke w/ SOD resident  Pt denies any questions or concerns from an OT standpoint as this time  Rec home w/ outpatient OT for hand therapy  No further acute OT needs warranted at this time  D/C OT      Goals   Patient Goals none stated   Plan   OT Frequency Eval only   Recommendation   OT Discharge Recommendation Outpatient OT  (for hand therapy)   OT - OK to Discharge Yes   Barthel Index   Feeding 10   Bathing 5   Grooming Score 5   Dressing Score 10   Bladder Score 10   Bowels Score 10   Toilet Use Score 10   Transfers (Bed/Chair) Score 15   Mobility (Level Surface) Score 15   Stairs Score 0   Barthel Index Score 90   Modified [place] Scale   Modified Paula Scale 2     Cara Menendez, OTR/L

## 2018-04-05 LAB
CHLAMYDIA DNA CVX QL NAA+PROBE: NORMAL
N GONORRHOEA DNA GENITAL QL NAA+PROBE: NORMAL

## 2018-04-06 LAB
BACTERIA SPEC BFLD CULT: NO GROWTH
CCP IGA+IGG SERPL IA-ACNC: 4 UNITS (ref 0–19)
GRAM STN SPEC: NORMAL
GRAM STN SPEC: NORMAL

## 2018-04-07 LAB
BACTERIA BLD CULT: NORMAL
BACTERIA BLD CULT: NORMAL

## 2018-04-09 ENCOUNTER — APPOINTMENT (EMERGENCY)
Dept: NON INVASIVE DIAGNOSTICS | Facility: HOSPITAL | Age: 33
DRG: 566 | End: 2018-04-09
Payer: MEDICARE

## 2018-04-09 ENCOUNTER — HOSPITAL ENCOUNTER (INPATIENT)
Facility: HOSPITAL | Age: 33
LOS: 2 days | Discharge: HOME | DRG: 566 | End: 2018-04-12
Attending: EMERGENCY MEDICINE | Admitting: INTERNAL MEDICINE
Payer: MEDICARE

## 2018-04-09 ENCOUNTER — APPOINTMENT (EMERGENCY)
Dept: RADIOLOGY | Facility: HOSPITAL | Age: 33
DRG: 566 | End: 2018-04-09
Payer: MEDICARE

## 2018-04-09 DIAGNOSIS — M25.461 PAIN AND SWELLING OF KNEE, RIGHT: Primary | ICD-10-CM

## 2018-04-09 DIAGNOSIS — R60.0 LEG EDEMA, RIGHT: ICD-10-CM

## 2018-04-09 DIAGNOSIS — M25.561 PAIN AND SWELLING OF KNEE, RIGHT: Primary | ICD-10-CM

## 2018-04-09 DIAGNOSIS — Z79.01 ON CONTINUOUS ORAL ANTICOAGULATION: ICD-10-CM

## 2018-04-09 DIAGNOSIS — R26.2 AMBULATORY DYSFUNCTION: ICD-10-CM

## 2018-04-09 DIAGNOSIS — D50.9 IRON DEFICIENCY ANEMIA: ICD-10-CM

## 2018-04-09 DIAGNOSIS — F20.9 SCHIZOPHRENIA (HCC): ICD-10-CM

## 2018-04-09 DIAGNOSIS — I26.99 PULMONARY EMBOLISM (HCC): ICD-10-CM

## 2018-04-09 DIAGNOSIS — R79.82 ELEVATED C-REACTIVE PROTEIN (CRP): ICD-10-CM

## 2018-04-09 DIAGNOSIS — D72.829 LEUKOCYTOSIS: ICD-10-CM

## 2018-04-09 DIAGNOSIS — R89.9 ABNORMAL SYNOVIAL FLUID: ICD-10-CM

## 2018-04-09 LAB
ANION GAP SERPL CALCULATED.3IONS-SCNC: 4 MMOL/L (ref 4–13)
BASOPHILS # BLD AUTO: 0.03 THOUSANDS/ΜL (ref 0–0.1)
BASOPHILS NFR BLD AUTO: 0 % (ref 0–1)
BUN SERPL-MCNC: 10 MG/DL (ref 5–25)
CALCIUM SERPL-MCNC: 9.5 MG/DL
CHLORIDE SERPL-SCNC: 106 MMOL/L (ref 100–108)
CO2 SERPL-SCNC: 25 MMOL/L (ref 21–32)
CREAT SERPL-MCNC: 1.08 MG/DL (ref 0.6–1.3)
CRP SERPL QL: 58.6 MG/L
CRYSTALS SNV QL MICRO: NORMAL
EOSINOPHIL # BLD AUTO: 0.28 THOUSAND/ΜL (ref 0–0.61)
EOSINOPHIL NFR BLD AUTO: 2 % (ref 0–6)
ERYTHROCYTE [DISTWIDTH] IN BLOOD BY AUTOMATED COUNT: 13.9 % (ref 11.6–15.1)
ERYTHROCYTE [SEDIMENTATION RATE] IN BLOOD: 54 MM/HOUR (ref 0–10)
GFR SERPL CREATININE-BSD FRML MDRD: 104 ML/MIN/1.73SQ M
GLUCOSE SERPL-MCNC: 109 MG/DL (ref 65–140)
GRAM STN SPEC: NORMAL
GRAM STN SPEC: NORMAL
HCT VFR BLD AUTO: 35 % (ref 36.5–49.3)
HGB BLD-MCNC: 11.7 G/DL (ref 12–17)
LYMPHOCYTES # BLD AUTO: 1.71 THOUSANDS/ΜL (ref 0.6–4.47)
LYMPHOCYTES NFR BLD AUTO: 14 % (ref 14–44)
MCH RBC QN AUTO: 25.6 PG (ref 26.8–34.3)
MCHC RBC AUTO-ENTMCNC: 33.4 G/DL (ref 31.4–37.4)
MCV RBC AUTO: 77 FL (ref 82–98)
MONOCYTES # BLD AUTO: 1.45 THOUSAND/ΜL (ref 0.17–1.22)
MONOCYTES NFR BLD AUTO: 12 % (ref 4–12)
MONOCYTES NFR SNV MANUAL: 2 %
NEUTROPHILS # BLD AUTO: 8.64 THOUSANDS/ΜL (ref 1.85–7.62)
NEUTROPHILS NFR SNV MANUAL: 98 %
NEUTS SEG NFR BLD AUTO: 72 % (ref 43–75)
NRBC BLD AUTO-RTO: 0 /100 WBCS
PLATELET # BLD AUTO: 435 THOUSANDS/UL (ref 149–390)
PMV BLD AUTO: 9.1 FL (ref 8.9–12.7)
POTASSIUM SERPL-SCNC: 4 MMOL/L (ref 3.5–5.3)
RBC # BLD AUTO: 4.57 MILLION/UL (ref 3.88–5.62)
RBC # SNV MANUAL: 156 /UL (ref 0–10)
SODIUM SERPL-SCNC: 135 MMOL/L (ref 136–145)
TOTAL CELLS COUNTED SPEC: 100
WBC # BLD AUTO: 12.16 THOUSAND/UL (ref 4.31–10.16)
WBC # FLD MANUAL: ABNORMAL /UL (ref 0–200)

## 2018-04-09 PROCEDURE — 87476 LYME DIS DNA AMP PROBE: CPT | Performed by: ORTHOPAEDIC SURGERY

## 2018-04-09 PROCEDURE — 89050 BODY FLUID CELL COUNT: CPT | Performed by: ORTHOPAEDIC SURGERY

## 2018-04-09 PROCEDURE — 86140 C-REACTIVE PROTEIN: CPT | Performed by: EMERGENCY MEDICINE

## 2018-04-09 PROCEDURE — 87070 CULTURE OTHR SPECIMN AEROBIC: CPT | Performed by: ORTHOPAEDIC SURGERY

## 2018-04-09 PROCEDURE — 87205 SMEAR GRAM STAIN: CPT | Performed by: ORTHOPAEDIC SURGERY

## 2018-04-09 PROCEDURE — 0S9C3ZX DRAINAGE OF RIGHT KNEE JOINT, PERCUTANEOUS APPROACH, DIAGNOSTIC: ICD-10-PCS | Performed by: ORTHOPAEDIC SURGERY

## 2018-04-09 PROCEDURE — 80048 BASIC METABOLIC PNL TOTAL CA: CPT | Performed by: EMERGENCY MEDICINE

## 2018-04-09 PROCEDURE — 85652 RBC SED RATE AUTOMATED: CPT | Performed by: EMERGENCY MEDICINE

## 2018-04-09 PROCEDURE — 84166 PROTEIN E-PHORESIS/URINE/CSF: CPT | Performed by: PATHOLOGY

## 2018-04-09 PROCEDURE — 93971 EXTREMITY STUDY: CPT

## 2018-04-09 PROCEDURE — 36415 COLL VENOUS BLD VENIPUNCTURE: CPT | Performed by: EMERGENCY MEDICINE

## 2018-04-09 PROCEDURE — 73564 X-RAY EXAM KNEE 4 OR MORE: CPT

## 2018-04-09 PROCEDURE — 89060 EXAM SYNOVIAL FLUID CRYSTALS: CPT | Performed by: ORTHOPAEDIC SURGERY

## 2018-04-09 PROCEDURE — 96372 THER/PROPH/DIAG INJ SC/IM: CPT

## 2018-04-09 PROCEDURE — 85025 COMPLETE CBC W/AUTO DIFF WBC: CPT | Performed by: EMERGENCY MEDICINE

## 2018-04-09 PROCEDURE — 99285 EMERGENCY DEPT VISIT HI MDM: CPT

## 2018-04-09 PROCEDURE — 89051 BODY FLUID CELL COUNT: CPT | Performed by: ORTHOPAEDIC SURGERY

## 2018-04-09 PROCEDURE — 84165 PROTEIN E-PHORESIS SERUM: CPT | Performed by: PATHOLOGY

## 2018-04-09 RX ORDER — KETOROLAC TROMETHAMINE 30 MG/ML
15 INJECTION, SOLUTION INTRAMUSCULAR; INTRAVENOUS ONCE
Status: COMPLETED | OUTPATIENT
Start: 2018-04-09 | End: 2018-04-09

## 2018-04-09 RX ORDER — ACETAMINOPHEN 325 MG/1
975 TABLET ORAL ONCE
Status: COMPLETED | OUTPATIENT
Start: 2018-04-09 | End: 2018-04-09

## 2018-04-09 RX ORDER — KETOROLAC TROMETHAMINE 30 MG/ML
15 INJECTION, SOLUTION INTRAMUSCULAR; INTRAVENOUS ONCE
Status: DISCONTINUED | OUTPATIENT
Start: 2018-04-09 | End: 2018-04-09

## 2018-04-09 RX ADMIN — ACETAMINOPHEN 975 MG: 325 TABLET, FILM COATED ORAL at 17:02

## 2018-04-09 RX ADMIN — KETOROLAC TROMETHAMINE 15 MG: 30 INJECTION, SOLUTION INTRAMUSCULAR at 17:24

## 2018-04-09 RX ADMIN — KETOROLAC TROMETHAMINE 15 MG: 30 INJECTION, SOLUTION INTRAMUSCULAR at 23:46

## 2018-04-09 NOTE — ED PROVIDER NOTES
History  Chief Complaint   Patient presents with    Leg Pain     Patient reports right leg pain x2 days, patient denies injury/trauma/swelling/deformity  Pulse, motor, sensation intact  This is a 76-year-old male with history of schizophrenia, previous PE on Xarelto presents for evaluation of right knee pain and swelling starting yesterday  Patient states that he has had worsening pain since yesterday and now is unable to move his knee  No associated fevers, chills, nausea, vomiting  Patient took Tylenol this morning with no relief of his pain  No history of Rheumatic disease or trauma to the knee  On questioning he denies any previous history of joint issues You recently was seen poor for similar presentation of left wrist pain and was admitted to the hospital for further workup at that time for concern of septic joint  In the hospital he was seen by Orthopedics and had joint aspiration which was negative for crystals or infection  Patient was transitioned to oral NSAIDs and discharged home after pain improved  Prior to Admission Medications   Prescriptions Last Dose Informant Patient Reported? Taking? ARIPiprazole (ABILIFY) 5 mg tablet 4/9/2018 at morning  No Yes   Sig: Take 1 tablet (5 mg total) by mouth daily   rivaroxaban (XARELTO) 20 mg tablet 4/9/2018 at morning  No Yes   Sig: Take 1 tablet (20 mg total) by mouth daily with breakfast      Facility-Administered Medications: None       Past Medical History:   Diagnosis Date    DVT (deep venous thrombosis) (HCC)     Pulmonary embolism (HCC)     Schizophrenia (HCC)        Past Surgical History:   Procedure Laterality Date    VASCULAR SURGERY         Family History   Problem Relation Age of Onset    No Known Problems Mother     No Known Problems Father     Hypertension Maternal Grandmother      I have reviewed and agree with the history as documented      Social History   Substance Use Topics    Smoking status: Never Smoker    Smokeless tobacco: Former User    Alcohol use Yes      Comment: former user        Review of Systems   Constitutional: Negative for chills and fever  HENT: Negative for rhinorrhea and sore throat  Respiratory: Negative for cough and shortness of breath  Cardiovascular: Negative for chest pain and palpitations  Gastrointestinal: Negative for abdominal pain, nausea and vomiting  Genitourinary: Negative for dysuria, frequency and urgency  Musculoskeletal:        Knee pain and swelling       Physical Exam  ED Triage Vitals [04/09/18 1407]   Temperature Pulse Respirations Blood Pressure SpO2   97 8 °F (36 6 °C) 77 18 129/74 98 %      Temp Source Heart Rate Source Patient Position - Orthostatic VS BP Location FiO2 (%)   Tympanic Monitor Sitting Right arm --      Pain Score       8           Orthostatic Vital Signs  Vitals:    04/10/18 1143 04/10/18 1225 04/10/18 1500 04/10/18 2332   BP: 133/61 114/53 137/75 134/60   Pulse: 78 78 81 77   Patient Position - Orthostatic VS: Lying Lying  Lying       Physical Exam   Constitutional: He is oriented to person, place, and time  He appears well-developed and well-nourished  HENT:   Head: Normocephalic and atraumatic  Cardiovascular: Normal rate, regular rhythm and normal heart sounds  Pulmonary/Chest: Effort normal and breath sounds normal    Abdominal: Soft  He exhibits no distension  There is no tenderness  Musculoskeletal:   Right knee joint tender to palpation inferior to the patella, swollen and warm to touch  Patient is refusing to move the knee secondary to pain  Distally extremities neurovascularly intact with good pulses and perfusion  Neurological: He is alert and oriented to person, place, and time  Skin: Skin is warm and dry  Psychiatric: He has a normal mood and affect  Nursing note and vitals reviewed        ED Medications  Medications   HYDROmorphone (DILAUDID) injection 0 2 mg (not administered)   ARIPiprazole (ABILIFY) tablet 5 mg (5 mg Oral Given 4/10/18 0834)   rivaroxaban (XARELTO) tablet 20 mg (20 mg Oral Given 4/10/18 0830)   acetaminophen (TYLENOL) tablet 975 mg (not administered)   ibuprofen (MOTRIN) tablet 800 mg (not administered)   oxyCODONE (ROXICODONE) IR tablet 5 mg (not administered)   vancomycin (VANCOCIN) 1,500 mg in sodium chloride 0 9 % 250 mL IVPB (0 mg Intravenous Stopped 4/11/18 0101)   acetaminophen (TYLENOL) tablet 975 mg (975 mg Oral Given 4/9/18 1702)   ketorolac (TORADOL) injection 15 mg (15 mg Intramuscular Given 4/9/18 1724)   ketorolac (TORADOL) injection 15 mg (15 mg Intramuscular Given 4/9/18 2346)   vancomycin (VANCOCIN) 1,500 mg in sodium chloride 0 9 % 250 mL IVPB (0 mg/kg × 108 kg Intravenous Stopped 4/10/18 0234)   sodium chloride 0 9 % bolus 500 mL (0 mL Intravenous Stopped 4/10/18 0239)       Diagnostic Studies  Results Reviewed     Procedure Component Value Units Date/Time    Blood culture [[id number]] Collected:  04/10/18 0103    Lab Status:  Preliminary result Specimen:  Blood from Arm, Right Updated:  04/11/18 0701     Blood Culture No Growth at 24 hrs  Blood culture [[id number]] Collected:  04/10/18 0103    Lab Status:  Preliminary result Specimen:  Blood from Arm, Left Updated:  04/11/18 0701     Blood Culture No Growth at 24 hrs  Lactic acid, plasma [[id number]]  (Normal) Collected:  04/10/18 0535    Lab Status:  Final result Specimen:  Blood from Arm, Right Updated:  04/10/18 0603     LACTIC ACID 0 7 mmol/L     Narrative:         Result may be elevated if tourniquet was used during collection      Basic metabolic panel [[id number]] Collected:  04/10/18 0535    Lab Status:  Final result Specimen:  Blood from Arm, Right Updated:  04/10/18 0600     Sodium 139 mmol/L      Potassium 3 9 mmol/L      Chloride 108 mmol/L      CO2 26 mmol/L      Anion Gap 5 mmol/L      BUN 12 mg/dL      Creatinine 0 94 mg/dL      Glucose 85 mg/dL      Calcium 8 8 mg/dL      eGFR 123 ml/min/1 73sq m     Narrative: National Kidney Disease Education Program recommendations are as follows:  GFR calculation is accurate only with a steady state creatinine  Chronic Kidney disease less than 60 ml/min/1 73 sq  meters  Kidney failure less than 15 ml/min/1 73 sq  meters  CBC (With Platelets) [[id number]]  (Abnormal) Collected:  04/10/18 0535    Lab Status:  Final result Specimen:  Blood from Arm, Right Updated:  04/10/18 0548     WBC 9 11 Thousand/uL      RBC 4 18 Million/uL      Hemoglobin 10 7 (L) g/dL      Hematocrit 32 2 (L) %      MCV 77 (L) fL      MCH 25 6 (L) pg      MCHC 33 2 g/dL      RDW 14 0 %      Platelets 004 Thousands/uL      MPV 8 9 fL     Toxicology screen, urine [[id number]] Collected:  04/10/18 0139    Lab Status: In process Specimen:  Urine from Urine, Clean Catch Updated:  04/10/18 0144    Chlamydia/GC amplified DNA by PCR [[id number]] Collected:  04/10/18 0139    Lab Status:   In process Specimen:  Urine from Urine, Other Updated:  04/10/18 0143    STAT Gram Stain [[id number]] Collected:  04/09/18 1958    Lab Status:  Final result Specimen:  Other Updated:  04/09/18 2332     Gram Stain Result 3+ Polys      No bacteria seen    Synovial Fluid Diff [[id number]] Collected:  04/09/18 1958    Lab Status:  Final result Specimen:  Synovial Fluid from Joint, Right Knee Updated:  04/09/18 2235     Total Counted 100     Neutrophil % Synovial 98 %      Monocyte % Synovial 2 %     Synovial fluid white cell count w/ diff [[id number]]  (Abnormal) Collected:  04/09/18 1958    Lab Status:  Final result Specimen:  Synovial Fluid from Joint, Right Knee Updated:  04/09/18 2232     WBC, Fluid 52,880 (H) /ul     RBC count,Synovial Fluid [[id number]]  (Abnormal) Collected:  04/09/18 1958    Lab Status:  Final result Specimen:  Synovial Fluid from Joint, Right Knee Updated:  04/09/18 2231     RBC,SYNOVIAL 156 (H)    Synovial fluid, crystal [[id number]] Collected:  04/09/18 1958    Lab Status:  Final result Specimen:  Synovial Fluid from Joint, Right Knee Updated:  04/09/18 2039     Crystals, Synovial Fluid No Crystals Seen    Lyme disease, PCR [[id number]] Collected:  04/09/18 1958    Lab Status: In process Specimen:  Blood from Joint, Right Knee Updated:  04/09/18 2005    Body fluid culture and Gram stain [[id number]] Collected:  04/09/18 1958    Lab Status: In process Specimen:  Synovial Fluid from Joint, Right Knee Updated:  04/09/18 2005    Sedimentation rate, automated [[id number]]  (Abnormal) Collected:  04/09/18 1710    Lab Status:  Final result Specimen:  Blood from Arm, Right Updated:  04/09/18 1910     Sed Rate 54 (H) mm/hour     Basic metabolic panel [[id number]]  (Abnormal) Collected:  04/09/18 1710    Lab Status:  Final result Specimen:  Blood from Arm, Right Updated:  04/09/18 1736     Sodium 135 (L) mmol/L      Potassium 4 0 mmol/L      Chloride 106 mmol/L      CO2 25 mmol/L      Anion Gap 4 mmol/L      BUN 10 mg/dL      Creatinine 1 08 mg/dL      Glucose 109 mg/dL      Calcium 9 5 mg/dL      eGFR 104 ml/min/1 73sq m     Narrative:         National Kidney Disease Education Program recommendations are as follows:  GFR calculation is accurate only with a steady state creatinine  Chronic Kidney disease less than 60 ml/min/1 73 sq  meters  Kidney failure less than 15 ml/min/1 73 sq  meters      C-reactive protein [[id number]]  (Abnormal) Collected:  04/09/18 1710    Lab Status:  Final result Specimen:  Blood from Arm, Right Updated:  04/09/18 1736     CRP 58 6 (H) mg/L     CBC and differential [[id number]]  (Abnormal) Collected:  04/09/18 1710    Lab Status:  Final result Specimen:  Blood from Arm, Right Updated:  04/09/18 1733     WBC 12 16 (H) Thousand/uL      RBC 4 57 Million/uL      Hemoglobin 11 7 (L) g/dL      Hematocrit 35 0 (L) %      MCV 77 (L) fL      MCH 25 6 (L) pg      MCHC 33 4 g/dL      RDW 13 9 %      MPV 9 1 fL      Platelets 554 (H) Thousands/uL      nRBC 0 /100 WBCs      Neutrophils Relative 72 %      Lymphocytes Relative 14 % Monocytes Relative 12 %      Eosinophils Relative 2 %      Basophils Relative 0 %      Neutrophils Absolute 8 64 (H) Thousands/µL      Lymphocytes Absolute 1 71 Thousands/µL      Monocytes Absolute 1 45 (H) Thousand/µL      Eosinophils Absolute 0 28 Thousand/µL      Basophils Absolute 0 03 Thousands/µL                  VAS lower limb venous duplex study, unilateral/limited   Final Result by Stephanie Ng MD (04/10 1422)      XR knee 4+ vw right injury   Final Result by Irene Hoskins MD (04/09 2026)      No acute osseous abnormality or soft tissue mass  Workstation performed: [id number]               Procedures  Procedures      Phone Consults  ED Phone Contact    ED Course  ED Course as of Apr 11 0720 Mon Apr 09, 2018 1811 Spoke to BODØ from Ortho, will come evaluate patient , consult placed    1906  Patient resting comfortably without any complaints at this time, sleeping in the bed    2045  Patient able to ambulate with some limping and complaints of knee pain, awaiting white cell count and disposition per Ortho    2256 Further management per orthopedic resident, patient currently resting comfortably                                MDM  Number of Diagnoses or Management Options  Diagnosis management comments: 23-year-old male presents for evaluation of right knee swelling and pain    Will obtain lab work, x-rays and consult Orthopedics for further evaluation    CritCare Time    Disposition  Final diagnoses:   Pain and swelling of knee, right   Leukocytosis   Ambulatory dysfunction   Schizophrenia ([address] )   On continuous oral anticoagulation   Elevated C-reactive protein (CRP)   Abnormal synovial fluid     Time reflects when diagnosis was documented in both MDM as applicable and the Disposition within this note     Time User Action Codes Description Comment    4/9/2018  6:06 PM Betsy Rose Add [B37 364,  I26 435] Pain and swelling of knee, right     4/9/2018  8:22 PM Cody Felix Add [R60 0] Leg edema, right     4/10/2018 12:30 AM Kamala, Avtar Woodford Add [Z30 635] Leukocytosis     4/10/2018 12:31 AM Kamala, Link Woodford Add [R26 2] Ambulatory dysfunction     4/10/2018 12:31 AM Kamala, Link Woodford Add [F20 9] Schizophrenia ([facility] )     4/10/2018 12:31 AM Kamala, Link Woodford Add [Z79 01] On continuous oral anticoagulation     4/10/2018 12:31 AM Kamala, Link Woodford Add [R79 82] Elevated C-reactive protein (CRP)     4/10/2018 12:31 AM Kamala, Link Woodford Add [R89 9] Abnormal synovial fluid       ED Disposition     ED Disposition Condition Comment    Admit  Case was discussed with SOD and the patient's admission status was agreed to be Admission Status: observation status to the service of Dr Chen Trimble  Follow-up Information    None       Current Discharge Medication List      CONTINUE these medications which have NOT CHANGED    Details   ARIPiprazole (ABILIFY) 5 mg tablet Take 1 tablet (5 mg total) by mouth daily  Qty: 30 tablet, Refills: 1    Associated Diagnoses: Schizophrenia (HCC)      rivaroxaban (XARELTO) 20 mg tablet Take 1 tablet (20 mg total) by mouth daily with breakfast  Qty: 30 tablet, Refills: 0    Associated Diagnoses: Other pulmonary embolism with acute cor pulmonale, unspecified chronicity ([facility] )           No discharge procedures on file  ED Provider  Attending physically available and evaluated Ebony Frias I managed the patient along with the ED Attending      Electronically Signed by         Sisi Mayo MD  04/11/18 9528

## 2018-04-09 NOTE — ED NOTES
Pt  Lying in bed, sleeping  Awoken only to verbal stimuli  Pt  Very sleepy when awoken, eyes blood shot  Dose of dilaudid held at this time  Dr Jono Doss aware  Orthopedics to see patient        Yarely Hernandez RN  04/09/18 1195 [facility] Eddie Simeon RN  04/09/18 5906

## 2018-04-09 NOTE — LETTER
[address] [address]  [address] [id number]  Dept: [phone]    April 12, 2018     Patient: Ara Jonas   YOB: 1985   Date of Visit: 4/9/2018       To Whom it May Concern:    Ara Jonas is under my professional care  He was seen in the hospital from 4/9/2018   to 04/12/18  He may return to work on 4/16/18    If you have any questions or concerns, please don't hesitate to call           Sincerely,          Douglas Gutierrez MD

## 2018-04-09 NOTE — ED NOTES
Venous doppler was ordered from vo Dr Alexandra Helm, notified vascular who is backed up and is not available right now  Will call when they have availability       Vesna Nolen RN  04/09/18 3444

## 2018-04-09 NOTE — CONSULTS
Orthopedics   Vaibhav Dixon 34 y o  male MRN: [id number]  Unit/Bed#: X ray      Chief Complaint:   right knee pain    HPI:   34 y o male community ambulator complaining of 2 day history of  knee pain  Patient has never had this feeling before  He does not remember what he was doing when the pain started, when the pain has gotten worse and worse to the point where it is hard for him to bear weight on it  Patient does not bearing weight on it today but with great difficulty  He does not remember any trauma, twisting injury, pop, or inciting of that pain started  Pain does not wake him up from sleep  Pain does not radiate and is worse with weight-bearing  He has not tried anything for it  He was seen by the orthopedic team 1 week ago for left wrist pain  Joint aspiration yielded no signs of gout, pseudogout, or infection  Review Of Systems:   · Skin: Normal  · Neuro: See HPI  · Musculoskeletal: See HPI  · 14 point review of systems negative except as stated above     Past Medical History:   Past Medical History:   Diagnosis Date    DVT (deep venous thrombosis) ([facility] )     Pulmonary embolism (HCC)     Schizophrenia ([address] [place] [address] )        Past Surgical History:   History reviewed  No pertinent surgical history      Family History:  Family history reviewed and non-contributory  Family History   Problem Relation Age of Onset    No Known Problems Mother     No Known Problems Father        Social History:  Social History     Social History    Marital status: Single     Spouse name: N/A    Number of children: N/A    Years of education: N/A     Social History Main Topics    Smoking status: Never Smoker    Smokeless tobacco: Former User    Alcohol use Yes      Comment: former user    Drug use: Yes     Types: Marijuana    Sexual activity: Not Asked     Other Topics Concern    None     Social History Narrative    None       Allergies:   No Known Allergies        Labs:    0  Lab Value Date/Time   HCT 35 0 (L) 04/09/2018 1710   HCT 39 2 04/03/2018 0451   HCT 37 5 04/02/2018 0943   HGB 11 7 (L) 04/09/2018 1710   HGB 12 4 04/03/2018 0451   HGB 12 3 04/02/2018 0943   INR 1 11 02/16/2018 1945   WBC 12 16 (H) 04/09/2018 1710   WBC 5 82 04/03/2018 0451   WBC 7 86 04/02/2018 0943   ESR 54 (H) 04/09/2018 1710   CRP 58 6 (H) 04/09/2018 1710       Meds:    Current Facility-Administered Medications:     HYDROmorphone (DILAUDID) injection 0 2 mg, 0 2 mg, Intravenous, Q3H PRN, Sarabjit Mills MD, Stopped at 04/09/18 1928    Current Outpatient Prescriptions:     ARIPiprazole (ABILIFY) 5 mg tablet, Take 1 tablet (5 mg total) by mouth daily, Disp: 30 tablet, Rfl: 1    rivaroxaban (XARELTO) 20 mg tablet, Take 1 tablet (20 mg total) by mouth daily with breakfast, Disp: 30 tablet, Rfl: 0    Blood Culture:   Lab Results   Component Value Date    BLOODCX No Growth After 5 Days  04/07/2018    BLOODCX No Growth After 5 Days  04/07/2018       Wound Culture:   No results found for: WOUNDCULT    Ins and Outs:  No intake/output data recorded  Physical Exam:   /57   Pulse 79   Temp 98 9 °F (37 2 °C) (Oral)   Resp 18   Ht 5' 7" (1 702 m)   Wt 108 kg (238 lb)   SpO2 96%   BMI 37 28 kg/m²   Gen: Alert and oriented to person, place, time  HEENT: EOMI, eyes clear, moist mucus membranes, hearing intact  Respiratory: Bilateral chest rise  No audible wheezing found  Cardiovascular: Regular Rate and Rhythm  Abdomen: soft nontender/nondistended  Musculoskeletal: right lower extremity  · Skin intact, mildly swollen  · Tender to palpation over Medial, lateral, superior, inferior and anterior knee  · Can perform straight leg raise  · Stable to varus/valgus stress, but pain on exam  · Negative lachmans, Posterior draw  · Sensation intact L2-S1  · Intact Dorsiflexion/plantarflexion/EHL/FHL  · 2+ DP    Radiology:   I personally reviewed the films    X-rays right knee shows no signs of fracture or dislocation    [employer] Zack 34 y o  male MRN: [id number]  Unit/Bed#: X ray    Procedure: right knee aspiration    After sterile preparation of the skin overlying the knee local anesthetic was provided with 5cc of 1% lidocaine  An 18 gauge needle was then  inserted via a superior lateral portal   Approx 20 cc of synovial fluid was aspirated and sent for gram stain, culture, synovial WBC/RBC, and crystals  Sterile dressing was then applied  Pt tolerated the procedure well and was neurovascularly intact both pre and post procedure      Bhavik Cruz MD    Assessment:  34 y o male with right knee pain     Plan:   · Weight bearing as tolerated  right lower extremity  · Follow up labs for cell count, gram stain, Lyme, and gonococcal labs  · PT  · Pain control  · Dispo: Ortho will follow    Bhavik Cruz MD

## 2018-04-09 NOTE — ED NOTES
Orthopedics resident at pt's bedside performing local procedure        Aimee Gabriel RN  04/09/18 7779

## 2018-04-09 NOTE — ED NOTES
Report taken from Louise SALGADO  Transfer of care occurred        Alayna Hernandez RN  04/09/18 4465

## 2018-04-10 PROBLEM — M25.461 PAIN AND SWELLING OF RIGHT KNEE: Status: ACTIVE | Noted: 2018-04-10

## 2018-04-10 PROBLEM — D72.829 LEUKOCYTOSIS: Status: ACTIVE | Noted: 2018-04-10

## 2018-04-10 PROBLEM — M25.561 PAIN AND SWELLING OF RIGHT KNEE: Status: ACTIVE | Noted: 2018-04-10

## 2018-04-10 LAB
ANION GAP SERPL CALCULATED.3IONS-SCNC: 5 MMOL/L (ref 4–13)
BUN SERPL-MCNC: 12 MG/DL (ref 5–25)
CALCIUM SERPL-MCNC: 8.8 MG/DL
CHLORIDE SERPL-SCNC: 108 MMOL/L (ref 100–108)
CO2 SERPL-SCNC: 26 MMOL/L (ref 21–32)
CREAT SERPL-MCNC: 0.94 MG/DL (ref 0.6–1.3)
ERYTHROCYTE [DISTWIDTH] IN BLOOD BY AUTOMATED COUNT: 14 % (ref 11.6–15.1)
GFR SERPL CREATININE-BSD FRML MDRD: 123 ML/MIN/1.73SQ M
GLUCOSE SERPL-MCNC: 85 MG/DL (ref 65–140)
HCT VFR BLD AUTO: 32.2 % (ref 36.5–49.3)
HGB BLD-MCNC: 10.7 G/DL (ref 12–17)
LACTATE SERPL-SCNC: 0.7 MMOL/L (ref 0.5–2)
MCH RBC QN AUTO: 25.6 PG (ref 26.8–34.3)
MCHC RBC AUTO-ENTMCNC: 33.2 G/DL (ref 31.4–37.4)
MCV RBC AUTO: 77 FL (ref 82–98)
PLATELET # BLD AUTO: 367 THOUSANDS/UL (ref 149–390)
PMV BLD AUTO: 8.9 FL (ref 8.9–12.7)
POTASSIUM SERPL-SCNC: 3.9 MMOL/L (ref 3.5–5.3)
RBC # BLD AUTO: 4.18 MILLION/UL (ref 3.88–5.62)
SODIUM SERPL-SCNC: 139 MMOL/L (ref 136–145)
WBC # BLD AUTO: 9.11 THOUSAND/UL (ref 4.31–10.16)

## 2018-04-10 PROCEDURE — 99222 1ST HOSP IP/OBS MODERATE 55: CPT | Performed by: INTERNAL MEDICINE

## 2018-04-10 PROCEDURE — 87075 CULTR BACTERIA EXCEPT BLOOD: CPT | Performed by: INTERNAL MEDICINE

## 2018-04-10 PROCEDURE — 36415 COLL VENOUS BLD VENIPUNCTURE: CPT | Performed by: INTERNAL MEDICINE

## 2018-04-10 PROCEDURE — 93971 EXTREMITY STUDY: CPT | Performed by: SURGERY

## 2018-04-10 PROCEDURE — 80048 BASIC METABOLIC PNL TOTAL CA: CPT | Performed by: INTERNAL MEDICINE

## 2018-04-10 PROCEDURE — 87491 CHLMYD TRACH DNA AMP PROBE: CPT | Performed by: EMERGENCY MEDICINE

## 2018-04-10 PROCEDURE — 87158 CULTURE TYPING ADDED METHOD: CPT | Performed by: INTERNAL MEDICINE

## 2018-04-10 PROCEDURE — 87591 N.GONORRHOEAE DNA AMP PROB: CPT | Performed by: EMERGENCY MEDICINE

## 2018-04-10 PROCEDURE — 87040 BLOOD CULTURE FOR BACTERIA: CPT | Performed by: INTERNAL MEDICINE

## 2018-04-10 PROCEDURE — 85027 COMPLETE CBC AUTOMATED: CPT | Performed by: INTERNAL MEDICINE

## 2018-04-10 PROCEDURE — 80307 DRUG TEST PRSMV CHEM ANLYZR: CPT | Performed by: INTERNAL MEDICINE

## 2018-04-10 PROCEDURE — 87081 CULTURE SCREEN ONLY: CPT | Performed by: INTERNAL MEDICINE

## 2018-04-10 PROCEDURE — 83605 ASSAY OF LACTIC ACID: CPT | Performed by: INTERNAL MEDICINE

## 2018-04-10 PROCEDURE — 99222 1ST HOSP IP/OBS MODERATE 55: CPT | Performed by: ORTHOPAEDIC SURGERY

## 2018-04-10 RX ORDER — IBUPROFEN 400 MG/1
800 TABLET ORAL EVERY 6 HOURS PRN
Status: DISCONTINUED | OUTPATIENT
Start: 2018-04-10 | End: 2018-04-12

## 2018-04-10 RX ORDER — ACETAMINOPHEN 325 MG/1
975 TABLET ORAL EVERY 6 HOURS PRN
Status: DISCONTINUED | OUTPATIENT
Start: 2018-04-10 | End: 2018-04-12 | Stop reason: HOSPADM

## 2018-04-10 RX ORDER — OXYCODONE HYDROCHLORIDE 5 MG/1
5 TABLET ORAL EVERY 4 HOURS PRN
Status: DISCONTINUED | OUTPATIENT
Start: 2018-04-10 | End: 2018-04-12

## 2018-04-10 RX ORDER — ARIPIPRAZOLE 5 MG/1
5 TABLET ORAL DAILY
Status: DISCONTINUED | OUTPATIENT
Start: 2018-04-10 | End: 2018-04-12 | Stop reason: HOSPADM

## 2018-04-10 RX ADMIN — SODIUM CHLORIDE 500 ML: 0.9 INJECTION, SOLUTION INTRAVENOUS at 01:39

## 2018-04-10 RX ADMIN — RIVAROXABAN 20 MG: 20 TABLET, FILM COATED ORAL at 08:30

## 2018-04-10 RX ADMIN — ARIPIPRAZOLE 5 MG: 5 TABLET ORAL at 08:34

## 2018-04-10 RX ADMIN — VANCOMYCIN HYDROCHLORIDE 1500 MG: 10 INJECTION, POWDER, LYOPHILIZED, FOR SOLUTION INTRAVENOUS at 17:08

## 2018-04-10 RX ADMIN — VANCOMYCIN HYDROCHLORIDE 1500 MG: 1 INJECTION, POWDER, LYOPHILIZED, FOR SOLUTION INTRAVENOUS at 01:04

## 2018-04-10 RX ADMIN — VANCOMYCIN HYDROCHLORIDE 1500 MG: 10 INJECTION, POWDER, LYOPHILIZED, FOR SOLUTION INTRAVENOUS at 21:06

## 2018-04-10 NOTE — CASE MANAGEMENT
Initial Clinical Review    Admission: Date/Time/Statement: 4/10/18 @ 0033 OBSERVATION  CHANGED TO INPATIENT ON 4/10/18 @ 1315 DUE TO CONTINUED NEED FOR IV ANTIBIOTICS  Start   Ordered   04/10/18 1315  Inpatient Admission Once     Transfer Service: General Medicine       Question Answer Comment   Admitting Physician NADER RAMIREZ    Level of Care Med Surg    Estimated length of stay More than 2 Midnights    Certification I certify that inpatient services are medically necessary for this patient for a duration of greater than two midnights  See H&P and MD Progress Notes for additional information about the patient's course of treatment  ED: Date/Time/Mode of Arrival:   ED Arrival Information     Expected Arrival Acuity Means of Arrival Escorted By Service Admission Type    - 4/9/2018 14:02 Urgent Walk-In Self General Medicine Urgent    Arrival Complaint    leg pain        Chief Complaint:   Chief Complaint   Patient presents with    Leg Pain     Patient reports right leg pain x2 days, patient denies injury/trauma/swelling/deformity  History of Illness: This is a 59-year-old male with history of previous PE on Xarelto presents for evaluation of right knee pain and swelling starting yesterday  Patient states that he has had worsening pain since yesterday and now is unable to move his knee  Patient took Tylenol this morning with no relief of his pain  Physical exam reveals right knee joint tender to palpation inferior to the patella, swollen and warm to touch  Patient is refusing to move the knee secondary to pain      ED Vital Signs:   ED Triage Vitals [04/09/18 1407]   Temperature Pulse Respirations Blood Pressure SpO2   97 8 °F (36 6 °C) 77 18 129/74 98 %      Temp Source Heart Rate Source Patient Position - Orthostatic VS BP Location FiO2 (%)   Tympanic Monitor Sitting Right arm --      Pain Score       8        Wt Readings from Last 1 Encounters:   04/09/18 108 kg (238 lb) Vital Signs: WNL      Abnormal Labs/Diagnostic Test Results:     Sed rate: 54  C-reactive protein = 58 6    Venous duplex study (Right LE): no acute or chronic DVT  R knee synovial fluid aspirate sent for culture and gram stain  ED Treatment:   Medication Administration from 04/09/2018 1402 to 04/10/2018 0930       Date/Time Order Dose Route Action Comments     04/09/2018 1702 acetaminophen (TYLENOL) tablet 975 mg 975 mg Oral Given      04/09/2018 1724 ketorolac (TORADOL) injection 15 mg 15 mg Intramuscular Given      04/09/2018 1928 HYDROmorphone (DILAUDID) injection 0 2 mg 0 mg Intravenous Hold      04/09/2018 2346 ketorolac (TORADOL) injection 15 mg 15 mg Intramuscular Given      04/10/2018 0104 vancomycin (VANCOCIN) 1,500 mg in sodium chloride 0 9 % 250 mL IVPB 1,500 mg Intravenous New Bag      04/10/2018 0834 ARIPiprazole (ABILIFY) tablet 5 mg 5 mg Oral Given      04/10/2018 0830 rivaroxaban (XARELTO) tablet 20 mg 20 mg Oral Given      04/10/2018 0139 sodium chloride 0 9 % bolus 500 mL 500 mL Intravenous New Bag           Past Medical/Surgical History: Active Ambulatory Problems     Diagnosis Date Noted    Pulmonary embolism ([facility] [address] ) 02/16/2018    Marijuana abuse 02/16/2018    Schizophrenia ([facility] )     DVT (deep venous thrombosis) (HCC)     Pain and swelling of left wrist 04/02/2018     Past Medical History:   Diagnosis Date    DVT (deep venous thrombosis) (HCC)     Pulmonary embolism (HCC)     Schizophrenia (HCC)        Admitting Diagnosis: Leg pain [M79 606]    Age/Sex: 34 y o  male    Assessment/Plan:  Right Knee Pain   · Differential for rheumatologic versus infection source  · Positive NADER with titer 40  Rheumatoid factor screen and CCP IgG was negative  · Sed rate elevated, C reactive protein elevated at 58 6   · 4/2018 Rapid HIV - nonreactive  · Ortho consulted, performed joint aspiration  RBC count in the synovial fluid 156, WBC 52, 880 with 98% neutrophils, no crystals seen    Stat Gram stain showed 3+ polys, no bacteria  · WBC shows some leukocytosis with 72% neutrophils,  · Blood cultures pending  Lactic acid pending  · Chlamydia, gonococcal PCR pending  · Tylenol for pain, oxycodone for breakthrough pain  · Continue vancomycin for now  IV NS for hydration       History of Pulmonary embolism/DVT  · Continue Xarelto 20mg po daily and SCDs      Schizophrenia  · Continue Abilify 5mg po daily  · Patient follows up with a psychiatrist as an outpatient      Microcytic anemia  · MCV 77, hemoglobin 11 7, RDW Is normal    · No signs of active bleeding  · Continue to monitor  Outpatient follow up          Code Status: Level 1 - Full Code  VTE Pharmacologic Prophylaxis:  Xarelto  VTE Mechanical Prophylaxis: sequential compression device (L)  Admission Status: OBSERVATION       Admission Orders:    Orthopedics consult  Rheumatology consult  Regular diet  Blood cultures pending  Synovial fluid cultures pending  PT eval and treat  Up with assistance    Scheduled Meds:   Current Facility-Administered Medications:  acetaminophen 975 mg Oral Q6H PRN   ARIPiprazole 5 mg Oral Daily   HYDROmorphone 0 2 mg Intravenous Q8H PRN   ibuprofen 800 mg Oral Q6H PRN   oxyCODONE 5 mg Oral Q4H PRN   rivaroxaban 20 mg Oral Daily With Breakfast   vancomycin 15 mg/kg Intravenous Q12H     Continuous Infusions:    None   =====================================================================  4/9/18 Orthopedics Consult    Assessment:  32 y o male with right knee pain      Plan:   · Weight bearing as tolerated  right lower extremity  · Follow up labs for cell count, gram stain, Lyme, and gonococcal labs  · PT  · Pain control  · Dispo: Ortho will follow     Procedure: right knee aspiration     After sterile preparation of the skin overlying the knee local anesthetic was provided with 5cc of 1% lidocaine    An 18 gauge needle was then  inserted via a superior lateral portal   Approx 20 cc of synovial fluid was aspirated and sent for gram stain, culture, synovial WBC/RBC, and crystals  Sterile dressing was then applied    Pt tolerated the procedure well and was neurovascularly intact both pre and post procedure

## 2018-04-10 NOTE — PHYSICIAN ADVISOR
Current patient class: Inpatient  The patient is currently on Hospital Day: 2      The patient was admitted to the hospital  on 4/10/18 at 1315 for the following diagnosis:  Elevated C-reactive protein (CRP) [R79 82]  Leg pain [M79 606]  Leukocytosis [D72 829]  Schizophrenia (HCC) [F20 9]  Abnormal synovial fluid [R89 9]  Leg edema, right [R60 0]  Ambulatory dysfunction [R26 2]  On continuous oral anticoagulation [Z79 01]  Pain and swelling of knee, right [M25 561, M25 461]       There is documentation in the medical record of an expected length of stay of at least 2 midnights  The patient is therefore expected to satisfy the 2 midnight benchmark and given the 2 midnight presumption is appropriate for INPATIENT ADMISSION  Given this expectation of a satisfying stay, CMS instructs us that the patient is most often appropriate for inpatient admission under part A provided medical necessity is documented in the chart  After review of the relevant documentation, labs, vital signs and test results, the patient is appropriate for INPATIENT ADMISSION  Admission to the hospital as an inpatient is a complex decision making process which requires the practitioner to consider the patients presenting complaint, history and physical examination and all relevant testing  With this in mind, in this case, the patient was deemed appropriate for INPATIENT ADMISSION  After review of the documentation and testing available at the time of the admission I concur with this clinical determination of medical necessity  The patient does have an inpatient admission within the previous 30 days  The patient was admitted on 4/2/18 and discharged on 4/4/18 as an inpatient  The patient therefore required readmission review  In this case the patient should be considered a RELATED INPATIENT ADMISSION  He is presenting with joint pain within two days of discharge and may be related to her rheumatologic condition      Rationale is as follows: The patient is a 34 yrs old   Male who presented to the ED at 4/9/2018  3:58 PM with a chief complaint of Leg Pain (Patient reports right leg pain x2 days, patient denies injury/trauma/swelling/deformity   Pulse, motor, sensation intact  )    The patients vitals on arrival were ED Triage Vitals [04/09/18 1407]   Temperature Pulse Respirations Blood Pressure SpO2   97 8 °F (36 6 °C) 77 18 129/74 98 %      Temp Source Heart Rate Source Patient Position - Orthostatic VS BP Location FiO2 (%)   Tympanic Monitor Sitting Right arm --      Pain Score       8           Past Medical History:   Diagnosis Date    DVT (deep venous thrombosis) (HCC)     Pulmonary embolism (HCC)     Schizophrenia (HCC)      Past Surgical History:   Procedure Laterality Date    VASCULAR SURGERY             Consults have been placed to:   IP CONSULT TO ORTHOPEDIC SURGERY  IP CONSULT TO CASE MANAGEMENT    Vitals:    04/10/18 1057 04/10/18 1143 04/10/18 1225 04/10/18 1500   BP: 131/58 133/61 114/53 137/75   BP Location:  Right arm Right arm    Pulse: 80 78 78 81   Resp: 16 18 18 18   Temp:   99 °F (37 2 °C) 99 6 °F (37 6 °C)   TempSrc:   Oral Oral   SpO2: 100% 100% 98% 98%   Weight:   108 kg (238 lb 11 2 oz)    Height:   5' 7" (1 702 m)        Most recent labs:    Recent Labs      04/10/18   0535   WBC  9 11   HGB  10 7*   HCT  32 2*   PLT  367   K  3 9   NA  139   CALCIUM  8 8   BUN  12   CREATININE  0 94       Scheduled Meds:  Current Facility-Administered Medications:  acetaminophen 975 mg Oral Q6H PRN Marleny Jocy, DO   ARIPiprazole 5 mg Oral Daily [facility]   HYDROmorphone 0 2 mg Intravenous Q8H PRN Marleny Jocy, DO   ibuprofen 800 mg Oral Q6H PRN Marleny Jocy, DO   oxyCODONE 5 mg Oral Q4H PRN Yoana Skipper, DO   rivaroxaban 20 mg Oral Daily With Breakfast Marleny [facility], DO   vancomycin 15 mg/kg Intravenous Q12H Pily Delvis, DO     Continuous Infusions:   PRN Meds:   acetaminophen    HYDROmorphone    ibuprofen   oxyCODONE

## 2018-04-10 NOTE — PROGRESS NOTES
IM Residency Progress Note   Unit/Bed#: ED 09 Encounter: [id number]  SOD Team A      Antonio Woodrow Meckel 34 y o  male [address] Stay Days: 0      Assessment/Plan:    Principal Problem:    Pain and swelling of right knee  Active Problems:    Pulmonary embolism (HCC)    Marijuana abuse    Schizophrenia (HCC)    DVT (deep venous thrombosis) (HCC)    Leukocytosis    Right Knee Pain: Await cultures, continue empiric antibiotics    History of Pulmonary embolism/DVT: Continue Xarelto 20mg po daily and SCDs      Schizophrenia: Continue Abilify 5mg po daily  · Patient follows up with a psychiatrist as an outpatient      Microcytic anemia: Continue to monitor  Outpatient follow up  Disposition: Continued care, pending Rheumatology  Subjective: Pt was seen and examined this morning with attending at bedside  No complaints other then right knee pain  Denied fever, chills, nausea, and vomiting  Vitals: Temp (24hrs), Av 2 °F (36 8 °C), Min:97 8 °F (36 6 °C), Max:98 9 °F (37 2 °C)  Current: Temperature: 97 9 °F (36 6 °C)  Vitals:    18 2105 04/10/18 0139 04/10/18 0345 04/10/18 0548   BP: 133/64 133/66 116/59 131/59   BP Location: Right arm      Pulse: 75 76 77 70   Resp: 16 16 16 16   Temp: 97 9 °F (36 6 °C)      TempSrc: Oral      SpO2: 100% 100% 100% 100%   Weight:       Height:        Body mass index is 37 28 kg/m²  I/O last 24 hours: In: 750 [IV Piggyback:750]  Out: -       Physical Exam   Constitutional: He is oriented to person, place, and time  He appears well-developed and well-nourished  No distress  HENT:   Head: Normocephalic and atraumatic  Eyes: Conjunctivae are normal  Right eye exhibits no discharge  Left eye exhibits no discharge  Neck: Neck supple  No JVD present  Cardiovascular: Normal rate and regular rhythm  No murmur heard  Pulmonary/Chest: No respiratory distress  He has no wheezes  Abdominal: Soft  He exhibits no distension  There is no tenderness  There is no rebound and no guarding  Musculoskeletal: Normal range of motion  He exhibits no edema  Left hand looking better then before   Right knee not erythematous, no crepitus, not swollen   Neurological: He is alert and oriented to person, place, and time  No cranial nerve deficit  Skin: Skin is warm and dry  No rash noted  He is not diaphoretic  No erythema           Invasive Devices     Peripheral Intravenous Line            Peripheral IV 04/10/18 Right Forearm less than 1 day                          Labs:   Recent Results (from the past 24 hour(s))   CBC and differential    Collection Time: 04/09/18  5:10 PM   Result Value Ref Range    WBC 12 16 (H) 4 31 - 10 16 Thousand/uL    RBC 4 57 3 88 - 5 62 Million/uL    Hemoglobin 11 7 (L) 12 0 - 17 0 g/dL    Hematocrit 35 0 (L) 36 5 - 49 3 %    MCV 77 (L) 82 - 98 fL    MCH 25 6 (L) 26 8 - 34 3 pg    MCHC 33 4 31 4 - 37 4 g/dL    RDW 13 9 11 6 - 15 1 %    MPV 9 1 8 9 - 12 7 fL    Platelets 740 (H) 647 - 390 Thousands/uL    nRBC 0 /100 WBCs    Neutrophils Relative 72 43 - 75 %    Lymphocytes Relative 14 14 - 44 %    Monocytes Relative 12 4 - 12 %    Eosinophils Relative 2 0 - 6 %    Basophils Relative 0 0 - 1 %    Neutrophils Absolute 8 64 (H) 1 85 - 7 62 Thousands/µL    Lymphocytes Absolute 1 71 0 60 - 4 47 Thousands/µL    Monocytes Absolute 1 45 (H) 0 17 - 1 22 Thousand/µL    Eosinophils Absolute 0 28 0 00 - 0 61 Thousand/µL    Basophils Absolute 0 03 0 00 - 0 10 Thousands/µL   Basic metabolic panel    Collection Time: 04/09/18  5:10 PM   Result Value Ref Range    Sodium 135 (L) 136 - 145 mmol/L    Potassium 4 0 3 5 - 5 3 mmol/L    Chloride 106 100 - 108 mmol/L    CO2 25 21 - 32 mmol/L    Anion Gap 4 4 - 13 mmol/L    BUN 10 5 - 25 mg/dL    Creatinine 1 08 0 60 - 1 30 mg/dL    Glucose 109 65 - 140 mg/dL    Calcium 9 5 mg/dL    eGFR 104 ml/min/1 73sq m   Sedimentation rate, automated    Collection Time: 04/09/18  5:10 PM   Result Value Ref Range    Sed Rate 54 (H) 0 - 10 mm/hour   C-reactive protein    Collection Time: 04/09/18  5:10 PM   Result Value Ref Range    CRP 58 6 (H) <3 0 mg/L   RBC count,Synovial Fluid    Collection Time: 04/09/18  7:58 PM   Result Value Ref Range    RBC,SYNOVIAL 156 (H) 0 - 10   Synovial fluid white cell count w/ diff    Collection Time: 04/09/18  7:58 PM   Result Value Ref Range    WBC, Fluid 52,880 (H) 0 - 200 /ul   Synovial fluid, crystal    Collection Time: 04/09/18  7:58 PM   Result Value Ref Range    Crystals, Synovial Fluid No Crystals Seen No Crystals Seen   STAT Gram Stain    Collection Time: 04/09/18  7:58 PM   Result Value Ref Range    Gram Stain Result 3+ Polys     Gram Stain Result No bacteria seen    Synovial Fluid Diff    Collection Time: 04/09/18  7:58 PM   Result Value Ref Range    Total Counted 100     Neutrophil % Synovial 98 %    Monocyte % Synovial 2 %   Basic metabolic panel    Collection Time: 04/10/18  5:35 AM   Result Value Ref Range    Sodium 139 136 - 145 mmol/L    Potassium 3 9 3 5 - 5 3 mmol/L    Chloride 108 100 - 108 mmol/L    CO2 26 21 - 32 mmol/L    Anion Gap 5 4 - 13 mmol/L    BUN 12 5 - 25 mg/dL    Creatinine 0 94 0 60 - 1 30 mg/dL    Glucose 85 65 - 140 mg/dL    Calcium 8 8 mg/dL    eGFR 123 ml/min/1 73sq m   CBC (With Platelets)    Collection Time: 04/10/18  5:35 AM   Result Value Ref Range    WBC 9 11 4 31 - 10 16 Thousand/uL    RBC 4 18 3 88 - 5 62 Million/uL    Hemoglobin 10 7 (L) 12 0 - 17 0 g/dL    Hematocrit 32 2 (L) 36 5 - 49 3 %    MCV 77 (L) 82 - 98 fL    MCH 25 6 (L) 26 8 - 34 3 pg    MCHC 33 2 31 4 - 37 4 g/dL    RDW 14 0 11 6 - 15 1 %    Platelets 544 550 - 959 Thousands/uL    MPV 8 9 8 9 - 12 7 fL   Lactic acid, plasma    Collection Time: 04/10/18  5:35 AM   Result Value Ref Range    LACTIC ACID 0 7 0 5 - 2 0 mmol/L       Radiology Results: I have personally reviewed pertinent reports  Other Diagnostic Testing:   I have personally reviewed pertinent reports          Active Meds:   Current Facility-Administered Medications   Medication Dose Route Frequency    acetaminophen (TYLENOL) tablet 975 mg  975 mg Oral Q6H PRN    ARIPiprazole (ABILIFY) tablet 5 mg  5 mg Oral Daily    HYDROmorphone (DILAUDID) injection 0 2 mg  0 2 mg Intravenous Q8H PRN    ibuprofen (MOTRIN) tablet 800 mg  800 mg Oral Q6H PRN    oxyCODONE (ROXICODONE) IR tablet 5 mg  5 mg Oral Q4H PRN    rivaroxaban (XARELTO) tablet 20 mg  20 mg Oral Daily With Breakfast    vancomycin (VANCOCIN) 1,500 mg in sodium chloride 0 9 % 250 mL IVPB  15 mg/kg Intravenous Q8H         VTE Pharmacologic Prophylaxis: Reason for no pharmacologic prophylaxis Xarelto  VTE Mechanical Prophylaxis: sequential compression device    Pily Edmondson DO

## 2018-04-10 NOTE — PROGRESS NOTES
[facility] Admission Note   Unit/Bed # @DBLINK (FPL,06063)@ Encounter: [id number]  SOD Team GABRIELLA Pyle 34 y o  male [id number]       Patient seen and examined  Reviewed H&P per Dr Iona Li  Agree with the assessment and plan except where otherwise noted  Assessment/Plan:   Principal Problem:    Pain and swelling of right knee  Active Problems:    Pulmonary embolism (HCC)    Marijuana abuse    Schizophrenia (HCC)    DVT (deep venous thrombosis) (HCC)    Leukocytosis       HPI: 67-year-old male with history of recently diagnosed DVT/PE on Xarelto, schizophrenia on Abilify, left wrist swelling/pain, who presents today with right knee swelling and pain without redness or warmth  He came to ER because of how severe the pain was and due to difficulty ambulating  +chills, denies fevers/sweats  Denies penile discharge or dysuria  Last sexual activity was >1 5 years ago  He was recently admitted one week ago (4/3) for the left wrist pain  The wrist was tapped at that point and showed 1,670 WBCS  Urine GC was negative  NADER positive  Blood cx were negative  HIV was negative, RF and CCP were negative  Urine was positive for THC  No abx were initiated  He was dc with wrist splint and plan for Ortho follow-up outpatient  Today in ER the right knee was tapped by Ortho and showed over 54,000 WBCs with 98% neutrophils  Gram stain preliminary is negative    No crystals seen  Fluid GC and Lyme are pending  He has a leukocytosis of 12,000 up from 5,000 last admission  ESR and CRP are elevated  Doppler US of LE is negative for DVT  He is afebrile and hemodynamically stable  Two sets of blood cx were drawn and are pending  UDS and repeat urine GC are pending  Plan:  -Will monitor the patient overnight to ensure he does not spike fevers or his WBC does not increase   Will have PT evaluate him for ambulatory dysfunction   -Will start IV Vanco per recommendation of Ortho who tapped his knee today   -Tylenol for mild pain, Motrin for moderate and IV Dilaudid 0 2 mg for severe pain  Oxycodone 5 mg PO for breakthrough pain   -Follow-up synovial fluid labs and peripheral blood cultures  -If remains hemodynamically stable can probably be discharged next 24h or so with po doxy to cover for Lyme and/or gonococcal arthritis  -May need rheumatological referral outpatient on discharge       Disposition:  OBSERVATION    Expected LOS: <2 98 Ewa Singh DO, Internal Medicine PGY-2  4/10/2018 1:15 AM

## 2018-04-10 NOTE — H&P
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, schizophrenia presented for evaluation of right knee pain  Patient states the right knee has been progressive become worse for the past two days  Pain has become so severe that he has been unable to bear weight on right leg  Pain is worsened by flexion and extension  Patient denies any swelling or erythema around the site  Patient denies any drug use and last recent sexual encounter was a year and a half ago  Denies any trauma or inciting event  Pain is constant and does not radiate anywhere  Patient was previously discharge approximately a week ago for extensive workup of left wrist pain, unrevealing for crystal arthropathies or infection  Rheumatologic investigation only yielded a positive NADER  Review of systems only positive for mild chills and right knee pain  Patient does have some mild pain at the left wrist, however states its improved  Patient denies any fevers, chills, shortness of breath, abdominal pain, N/V, lower extremity swelling, tingling or numbness  All other systems was negative this time  Patient is unsure of any family history of rheumatologic diseases SLE, Ra  Etc     Review of Systems   Review of Systems- negative except above  Past Medical History     Past Medical History:   Diagnosis Date    [facility])     [facility]monary embolism ([facility])     [facility]        Past Surgical History   History reviewed  No pertinent surgical history      Social History     History   Alcohol Use    Yes     Comment: former user History   Drug Use    Types: Marijuana     History   Smoking Status    Never Smoker   Smokeless Tobacco    Former User       Family History     Family History   Problem Relation Age of Onset    No Known Problems Mother     No Known Problems Father        Medications Prior to Admission     Prior to Admission medications    Medication Sig Start Date End Date Taking? Authorizing Provider   ARIPiprazole (ABILIFY) 5 mg tablet Take 1 tablet (5 mg total) by mouth daily 3/24/18  Yes Ana Rosa Tay MD   rivaroxaban (XARELTO) 20 mg tablet Take 1 tablet (20 mg total) by mouth daily with breakfast 3/11/18  Yes Clare Asif MD       Allergies   No Known Allergies    Objective     Vitals:    04/09/18 1608 04/09/18 1901 04/09/18 1923 04/09/18 2105   BP: 140/78 123/57  133/64   BP Location:    Right arm   Pulse: 74 79  75   Resp: 20 18  16   Temp:   98 9 °F (37 2 °C) 97 9 °F (36 6 °C)   TempSrc:   Oral Oral   SpO2: 98% 96%  100%   Weight:       Height:         Body mass index is 37 28 kg/m²  No intake or output data in the 24 hours ending 04/10/18 0040  Invasive Devices          No matching active lines, drains, or airways          Physical Exam  GENERAL: Appears well-developed and well-nourished  Appears in no acute distress   HEENT: Normocephalic and atraumatic  No scleral icterus  PERRLA  EOMI B/L  No oropharyngeal edema  MM moist    NECK: Neck supple with no lymphadenopathy  Trachea midline  No JVD  CARDIOVASCULAR: S1 and S2 are present  Regular rate and rhythm  No murmurs, rubs, or gallops  RESPIRATORY: CTA B/L, no rales, rhonci or wheezes  Normal respiratory expansion  ABDOMINAL: Bowel sounds present in all 4 quadrants, non-tender, soft, non-distended  No organomegaly, rebound, or guarding  EXTREMITIES: 2+ DP and PT pulses bilaterally; no cyanosis, clubbing, edema  ROM intact  UMANZOR x4   MUSCULOSKELETAL: Right knee warm and tender to palpation > L knee   Decreased active range of motion as well as passive  Mild R knee swelling noted  NEUROLOGIC: Patient is alert and oriented to person, place, and time  No sensory or motor deficits  CN 2-12 intact  Plantars downgoing bilaterally  Speech fluent  SKIN: Skin is warm and dry  No skin lesions are present  No rashes  PSYCHIATRIC: Normal mood and affect     Lab Results: I have personally reviewed pertinent reports  CBC:   Results from last 7 days  Lab Units 04/09/18  1710   WBC Thousand/uL 12 16*   RBC Million/uL 4 57   HEMOGLOBIN g/dL 11 7*   HEMATOCRIT % 35 0*   MCV fL 77*   MCH pg 25 6*   MCHC g/dL 33 4   RDW % 13 9   MPV fL 9 1   PLATELETS Thousands/uL 435*   NRBC AUTO /100 WBCs 0   NEUTROS PCT % 72   LYMPHS PCT % 14   MONOS PCT % 12   EOS PCT % 2   BASOS PCT % 0   NEUTROS ABS Thousands/µL 8 64*   LYMPHS ABS Thousands/µL 1 71   MONOS ABS Thousand/µL 1 45*   EOS ABS Thousand/µL 0 28   , Chemistry Profile:   Results from last 7 days  Lab Units 04/09/18  1710 04/03/18  0451   SODIUM mmol/L 135* 139   POTASSIUM mmol/L 4 0 4 0   CHLORIDE mmol/L 106 106   CO2 mmol/L 25 26   ANION GAP mmol/L 4 7   BUN mg/dL 10 10   CREATININE mg/dL 1 08 0 98   GLUCOSE RANDOM mg/dL 109 70   CALCIUM mg/dL 9 5 9 3   AST U/L  --  13   ALT U/L  --  13   ALK PHOS U/L  --  52   TOTAL PROTEIN g/dL  --  8 3*   BILIRUBIN TOTAL mg/dL  --  1 04*   EGFR ml/min/1 73sq m 104 117   , Coagulation Studies:   , Cardiac Studies:   , Additional Labs:   , iSTAT CHEM 8:   Results from last 7 days  Lab Units 04/09/18  1710   EGFR ml/min/1 73sq m 104   GLUCOSE RANDOM mg/dL 109   HEMOGLOBIN g/dL 11 7*   , ABG:   , Toxicology:   , Last A1C/Lipid Panel/Thyroid Panel: No results found for: HGBA1C, TRIG, CHOL, HDL, LDLCALC, [id number], T3FREE, K0HVGIB, FREET4    Imaging: I have personally reviewed pertinent films in PACS  Xr Wrist 3+ Views Left    Result Date: 4/2/2018  Narrative: LEFT WRIST INDICATION:   Severe localized left wrist pain and swelling   COMPARISON:  None VIEWS:  XR WRIST 3+ VW LEFT Images: 4 FINDINGS: There is no acute fracture or dislocation  No significant degenerative changes  No lytic or blastic lesions are seen  Mild dorsal soft tissue swelling along the hand  Impression: No acute osseous abnormality  Mild dorsal soft tissue swelling  Workstation performed: [id number]     Xr Knee 4+ Vw Right Injury    Result Date: 4/9/2018  Narrative: RIGHT KNEE INDICATION:   knee pain  COMPARISON:  None VIEWS:  XR KNEE 4+ VW RIGHT INJURY FINDINGS: There is no acute fracture or dislocation  There is no joint effusion  Evidence of prior Hospital Schlatter's disease    No lytic or blastic lesions are seen  Soft tissues are unremarkable  Impression: No acute osseous abnormality or soft tissue mass  Workstation performed: [id number]     Vas Upper Limb Venous Duplex Scan, Unilateral/limited    Result Date: 4/2/2018  Narrative:  [facility]PORT [facility]ICAL: Indications:  Left Edema, unspecified [R60 9]  Limb Pain [M79 609]  Symptoms of left wrist X several days  Physician wants to rule out deep and superficial thrombophlebitis  Risk Factors:  PE, DVT, schizophrenia, prior cigarette smoking, current marijuana use  Height:  67 inches  Weight:  230 lbs  Clinical: Left Upper Limb There is complaint of pain, edema, tenderness, and inflammation  FINDINGS:  Segment       Right            Left                        Impression       Impression       Int  Jugular  Normal (Patent)  Normal (Patent)     CONCLUSION:  Impression  RIGHT UPPER LIMB LIMITED: Evaluation shows no evidence of thrombus in the internal jugular vein, subclavian vein, and the brachiocephalic vein  LEFT UPPER LIMB: No evidence of acute deep vein thrombosis  No evidence of superficial thrombophlebitis noted  Doppler evaluation shows a normal response to augmentation maneuvers    SIGNATURE: Electronically Signed by: Laine Greer MD on 2018-04-02 06:16:54 PM      Microbiology: cultures obtained in emergency department include blood cultures    Urinalysis:       Invalid input(s): URIBILINOGEN     Urine Micro:        EKG, Pathology, and Other Studies: I have personally reviewed pertinent reports  Medications given in Emergency Department     Medication Administration - last 24 hours from 04/09/2018 0040 to 04/10/2018 0040       Date/Time Order Dose Route Action Action by     04/09/2018 1720 ketorolac (TORADOL) injection 15 mg 15 mg Intravenous Not Given Jeff Akers RN     04/09/2018 1702 acetaminophen (TYLENOL) tablet 975 mg 975 mg Oral Given Jeff Akers RN     04/09/2018 1724 ketorolac (TORADOL) injection 15 mg 15 mg Intramuscular Given Jeff Akers RN     04/09/2018 1928 HYDROmorphone (DILAUDID) injection 0 2 mg 0 mg Intravenous Hold Aimee Gabriel RN     04/09/2018 2346 ketorolac (TORADOL) injection 15 mg 15 mg Intramuscular Given Rehana Crawford RN          Assessment and Plan     Right Knee Pain   · Afebrile, nontoxic appearing, hemodynamically stable  Differential for rheumatologic versus infection source  · Positive NADER with titer 40  Rheumatoid factor screen and CCP IgG was negative  · Sed rate elevated, C reactive protein elevated at 58 6   · 4/2018 Rapid HIV - nonreactive  · Ortho consulted, performed joint aspiration  RBC count in the synovial fluid 156, WBC 52, 880 with 98% neutrophils, no crystals seen  Stat Gram stain showed 3+ polys, no bacteria  · WBC shows some leukocytosis with 72% neutrophils,  · Blood cultures pending  Lactic acid pending  · Chlamydia, gonococcal PCR pending  · Tylenol for pain, oxycodone for breakthrough pain  · Continue vancomycin for now  IV NS for hydration       History of Pulmonary embolism/DVT  · Continue Xarelto 20mg po daily and SCDs      Schizophrenia  · Continue Abilify 5mg po daily  · Patient follows up with a psychiatrist as an outpatient  Microcytic anemia  · MCV 77, hemoglobin 11 7, RDW Is normal    · No signs of active bleeding     · Continue to monitor  Outpatient follow up  Code Status: Level 1 - Full Code  VTE Pharmacologic Prophylaxis:  Xarelto  VTE Mechanical Prophylaxis: sequential compression device (L)  Admission Status: OBSERVATION    Admission Time  I spent 30 minutes admitting the patient  This involved direct patient contact where I performed a full history and physical, reviewing previous records, and reviewing laboratory and other diagnostic studies      Earl Butler MD  Internal Medicine  PGY-1

## 2018-04-10 NOTE — PROGRESS NOTES
Orthopedics   Vaibhav Dixon 34 y o  male MRN: [id number]  Unit/Bed#: ED 9      Subjective:   34 y o male with right knee pain and effusion s/p aspiration  No events overnight  Denies fevers or chills  Labs:    0  Lab Value Date/Time   HCT 32 2 (L) 04/10/2018 0535   HCT 35 0 (L) 04/09/2018 1710   HCT 39 2 04/03/2018 0451   HGB 10 7 (L) 04/10/2018 0535   HGB 11 7 (L) 04/09/2018 1710   HGB 12 4 04/03/2018 0451   INR 1 11 02/16/2018 1945   WBC 9 11 04/10/2018 0535   WBC 12 16 (H) 04/09/2018 1710   WBC 5 82 04/03/2018 0451   ESR 54 (H) 04/09/2018 1710   CRP 58 6 (H) 04/09/2018 1710       Meds:    Current Facility-Administered Medications:     acetaminophen (TYLENOL) tablet 975 mg, 975 mg, Oral, Q6H PRN, [facility]    ARIPiprazole (ABILIFY) tablet 5 mg, 5 mg, Oral, Daily, [facility]    HYDROmorphone (DILAUDID) injection 0 2 mg, 0 2 mg, Intravenous, Q8H PRN, [facility]    ibuprofen (MOTRIN) tablet 800 mg, 800 mg, Oral, Q6H PRN, [facility]    oxyCODONE (ROXICODONE) IR tablet 5 mg, 5 mg, Oral, Q4H PRN, [facility]    rivaroxaban (XARELTO) tablet 20 mg, 20 mg, Oral, Daily With Breakfast, Marleny Sandra DO    vancomycin (VANCOCIN) 1,500 mg in sodium chloride 0 9 % 250 mL IVPB, 15 mg/kg, Intravenous, Q8H, Marleny Sandra DO    Current Outpatient Prescriptions:     ARIPiprazole (ABILIFY) 5 mg tablet, Take 1 tablet (5 mg total) by mouth daily, Disp: 30 tablet, Rfl: 1    rivaroxaban (XARELTO) 20 mg tablet, Take 1 tablet (20 mg total) by mouth daily with breakfast, Disp: 30 tablet, Rfl: 0    Blood Culture:   Lab Results   Component Value Date    BLOODCX No Growth After 5 Days  04/07/2018    BLOODCX No Growth After 5 Days  04/07/2018       Wound Culture:   No results found for: WOUNDCULT    Ins and Outs:  I/O last 24 hours:   In: 750 [IV Piggyback:750]  Out: -           Physical Exam:   /59   Pulse 70   Temp 97 9 °F (36 6 °C) (Oral)   Resp 16   Ht 5' 7" (1 702 m)   Wt 108 kg (238 lb)   SpO2 100%   BMI 37 28 kg/m²   Musculoskeletal: right lower extremity  · Skin intact  · No knee effusion  · Diffusely tender over knee  · Can actively flex/extend knee  No micromotion tenderness  · Sensation intact L2-S1  · Intact Dorsiflexion/plantarflexion/EHL/FHL  · 2+ DP pulse    Assessment:  32 y o male with right knee pain and effusion s/p aspiration    Plan:   · Weight bearing as tolerated  right lower extremity  · Synovial WBC unconcerning for infection    Follow up cultures  · Antibiotics  · PT  · Pain control  · Dispo: Ortho will follow    Emigdio Wilcox MD

## 2018-04-11 LAB
ANION GAP SERPL CALCULATED.3IONS-SCNC: 6 MMOL/L (ref 4–13)
BUN SERPL-MCNC: 11 MG/DL (ref 5–25)
C3 SERPL-MCNC: 169 MG/DL (ref 90–180)
C4 SERPL-MCNC: 38 MG/DL (ref 10–40)
CALCIUM SERPL-MCNC: 9.1 MG/DL
CHLORIDE SERPL-SCNC: 106 MMOL/L (ref 100–108)
CO2 SERPL-SCNC: 26 MMOL/L (ref 21–32)
CREAT SERPL-MCNC: 0.88 MG/DL (ref 0.6–1.3)
ERYTHROCYTE [DISTWIDTH] IN BLOOD BY AUTOMATED COUNT: 13.5 % (ref 11.6–15.1)
FERRITIN SERPL-MCNC: 59 NG/ML (ref 8–388)
GFR SERPL CREATININE-BSD FRML MDRD: 130 ML/MIN/1.73SQ M
GLUCOSE SERPL-MCNC: 86 MG/DL (ref 65–140)
HBV CORE AB SER QL: NORMAL
HBV CORE IGM SER QL: NORMAL
HBV SURFACE AG SER QL: NORMAL
HCT VFR BLD AUTO: 34.7 % (ref 36.5–49.3)
HCV AB SER QL: NORMAL
HGB BLD-MCNC: 11 G/DL (ref 12–17)
IGA SERPL-MCNC: 308 MG/DL (ref 70–400)
IGG SERPL-MCNC: 1940 MG/DL (ref 700–1600)
IGM SERPL-MCNC: 122 MG/DL (ref 40–230)
IRON SATN MFR SERPL: 6 %
IRON SERPL-MCNC: 19 UG/DL (ref 65–175)
MCH RBC QN AUTO: 24.9 PG (ref 26.8–34.3)
MCHC RBC AUTO-ENTMCNC: 31.7 G/DL (ref 31.4–37.4)
MCV RBC AUTO: 79 FL (ref 82–98)
PLATELET # BLD AUTO: 404 THOUSANDS/UL (ref 149–390)
PMV BLD AUTO: 8.6 FL (ref 8.9–12.7)
POTASSIUM SERPL-SCNC: 3.8 MMOL/L (ref 3.5–5.3)
RBC # BLD AUTO: 4.41 MILLION/UL (ref 3.88–5.62)
SODIUM SERPL-SCNC: 138 MMOL/L (ref 136–145)
TIBC SERPL-MCNC: 306 UG/DL (ref 250–450)
VANCOMYCIN TROUGH SERPL-MCNC: 7.1 UG/ML (ref 10–20)
WBC # BLD AUTO: 7.48 THOUSAND/UL (ref 4.31–10.16)

## 2018-04-11 PROCEDURE — 85027 COMPLETE CBC AUTOMATED: CPT | Performed by: INTERNAL MEDICINE

## 2018-04-11 PROCEDURE — 85613 RUSSELL VIPER VENOM DILUTED: CPT | Performed by: INTERNAL MEDICINE

## 2018-04-11 PROCEDURE — 84166 PROTEIN E-PHORESIS/URINE/CSF: CPT | Performed by: INTERNAL MEDICINE

## 2018-04-11 PROCEDURE — 86255 FLUORESCENT ANTIBODY SCREEN: CPT | Performed by: INTERNAL MEDICINE

## 2018-04-11 PROCEDURE — 82787 IGG 1 2 3 OR 4 EACH: CPT | Performed by: INTERNAL MEDICINE

## 2018-04-11 PROCEDURE — 85670 THROMBIN TIME PLASMA: CPT | Performed by: INTERNAL MEDICINE

## 2018-04-11 PROCEDURE — 85732 THROMBOPLASTIN TIME PARTIAL: CPT | Performed by: INTERNAL MEDICINE

## 2018-04-11 PROCEDURE — 82728 ASSAY OF FERRITIN: CPT | Performed by: INTERNAL MEDICINE

## 2018-04-11 PROCEDURE — 80202 ASSAY OF VANCOMYCIN: CPT | Performed by: INTERNAL MEDICINE

## 2018-04-11 PROCEDURE — 86704 HEP B CORE ANTIBODY TOTAL: CPT | Performed by: INTERNAL MEDICINE

## 2018-04-11 PROCEDURE — 85705 THROMBOPLASTIN INHIBITION: CPT | Performed by: INTERNAL MEDICINE

## 2018-04-11 PROCEDURE — 86803 HEPATITIS C AB TEST: CPT | Performed by: INTERNAL MEDICINE

## 2018-04-11 PROCEDURE — 87340 HEPATITIS B SURFACE AG IA: CPT | Performed by: INTERNAL MEDICINE

## 2018-04-11 PROCEDURE — 83540 ASSAY OF IRON: CPT | Performed by: INTERNAL MEDICINE

## 2018-04-11 PROCEDURE — 86705 HEP B CORE ANTIBODY IGM: CPT | Performed by: INTERNAL MEDICINE

## 2018-04-11 PROCEDURE — 82784 ASSAY IGA/IGD/IGG/IGM EACH: CPT | Performed by: INTERNAL MEDICINE

## 2018-04-11 PROCEDURE — 99232 SBSQ HOSP IP/OBS MODERATE 35: CPT | Performed by: STUDENT IN AN ORGANIZED HEALTH CARE EDUCATION/TRAINING PROGRAM

## 2018-04-11 PROCEDURE — 80048 BASIC METABOLIC PNL TOTAL CA: CPT | Performed by: INTERNAL MEDICINE

## 2018-04-11 PROCEDURE — 86235 NUCLEAR ANTIGEN ANTIBODY: CPT | Performed by: INTERNAL MEDICINE

## 2018-04-11 PROCEDURE — 84165 PROTEIN E-PHORESIS SERUM: CPT | Performed by: INTERNAL MEDICINE

## 2018-04-11 PROCEDURE — 86146 BETA-2 GLYCOPROTEIN ANTIBODY: CPT | Performed by: INTERNAL MEDICINE

## 2018-04-11 PROCEDURE — 82595 ASSAY OF CRYOGLOBULIN: CPT | Performed by: INTERNAL MEDICINE

## 2018-04-11 PROCEDURE — 86160 COMPLEMENT ANTIGEN: CPT | Performed by: INTERNAL MEDICINE

## 2018-04-11 PROCEDURE — 85598 HEXAGNAL PHOSPH PLTLT NEUTRL: CPT | Performed by: INTERNAL MEDICINE

## 2018-04-11 PROCEDURE — 83550 IRON BINDING TEST: CPT | Performed by: INTERNAL MEDICINE

## 2018-04-11 RX ADMIN — IRON SUCROSE 200 MG: 20 INJECTION, SOLUTION INTRAVENOUS at 16:44

## 2018-04-11 RX ADMIN — RIVAROXABAN 20 MG: 20 TABLET, FILM COATED ORAL at 11:08

## 2018-04-11 RX ADMIN — VANCOMYCIN HYDROCHLORIDE 1250 MG: 10 INJECTION, POWDER, LYOPHILIZED, FOR SOLUTION INTRAVENOUS at 11:20

## 2018-04-11 RX ADMIN — ARIPIPRAZOLE 5 MG: 5 TABLET ORAL at 16:50

## 2018-04-11 RX ADMIN — VANCOMYCIN HYDROCHLORIDE 1250 MG: 10 INJECTION, POWDER, LYOPHILIZED, FOR SOLUTION INTRAVENOUS at 18:23

## 2018-04-11 NOTE — PROGRESS NOTES
Orthopedics   Vaibhav Dixon 34 y o  male MRN: [id number]  Unit/Bed#: CW2 217-01      Subjective:   34 y o male with right knee pain and effusion s/p aspiration  No overnight events complains of right calf pain  Labs:    0  Lab Value Date/Time   HCT 34 7 (L) 04/11/2018 0536   HCT 32 2 (L) 04/10/2018 0535   HCT 35 0 (L) 04/09/2018 1710   HGB 11 0 (L) 04/11/2018 0536   HGB 10 7 (L) 04/10/2018 0535   HGB 11 7 (L) 04/09/2018 1710   INR 1 11 02/16/2018 1945   WBC 7 48 04/11/2018 0536   WBC 9 11 04/10/2018 0535   WBC 12 16 (H) 04/09/2018 1710   ESR 54 (H) 04/09/2018 1710   CRP 58 6 (H) 04/09/2018 1710       Meds:    Current Facility-Administered Medications:     acetaminophen (TYLENOL) tablet 975 mg, 975 mg, Oral, Q6H PRN, Marleny Jocy, DO    ARIPiprazole (ABILIFY) tablet 5 mg, 5 mg, Oral, Daily, Marleny Jocy, DO, 5 mg at 04/10/18 0834    ibuprofen (MOTRIN) tablet 800 mg, 800 mg, Oral, Q6H PRN, Venkatesh Ground, DO    oxyCODONE (ROXICODONE) IR tablet 5 mg, 5 mg, Oral, Q4H PRN, Venkatesh Ground, DO    rivaroxaban (XARELTO) tablet 20 mg, 20 mg, Oral, Daily With Breakfast, Marleny Jocy, DO, 20 mg at 04/10/18 0830    vancomycin (VANCOCIN) 1,500 mg in sodium chloride 0 9 % 250 mL IVPB, 15 mg/kg, Intravenous, Q12H, Pily Edmondson DO, Stopped at 04/11/18 0101    Blood Culture:   Lab Results   Component Value Date    BLOODCX No Growth After 5 Days  04/07/2018    BLOODCX No Growth After 5 Days  04/07/2018       Wound Culture:   No results found for: WOUNDCULT    Ins and Outs:  I/O last 24 hours:   In: 1490 [P O :240; IV Piggyback:1250]  Out: -           Physical Exam:   /60 (BP Location: Right arm)   Pulse 77   Temp 99 °F (37 2 °C) (Oral)   Resp 18   Ht 5' 7" (1 702 m)   Wt 108 kg (238 lb 11 2 oz)   SpO2 98%   BMI 37 39 kg/m²   Musculoskeletal: right lower extremity  · Skin intact  · No knee effusion  · Diffusely tender over knee  · Painless knee ROM  · Tender over proximal medial calf  · Sensation intact L2-S1  · Intact Dorsiflexion/plantarflexion/EHL/FHL  · 2+ DP pulse    Assessment:  32 y o male with right knee pain and effusion s/p aspiration    Plan:   · Weight bearing as tolerated  right lower extremity  · Follow up cultures  · Duplex neg for DVT  · Antibiotics  · PT  · Pain control  · Dispo: Ortho will follow    Dilshad Purvis MD

## 2018-04-11 NOTE — ED ATTENDING ATTESTATION
Jorge Hodge DO, saw and evaluated the patient  I have discussed the patient with the resident/non-physician practitioner and agree with the resident's/non-physician practitioner's findings, Plan of Care, and MDM as documented in the resident's/non-physician practitioner's note, except where noted  All available labs and Radiology studies were reviewed  At this point I agree with the current assessment done in the Emergency Department  I have conducted an independent evaluation of this patient a history and physical is as follows:    Patient is a 27-year-old male with history of coronary embolism on Xarelto and patient also has history of schizophrenia the patient is presenting with right knee pain and swelling that began yesterday  Patient denies any trauma  But he states he has significant pain and tenderness and is now unable to move his right knee  Denies any fevers or chills  Patient was recently discharged from the hospital for left wrist pain and was admitted for a rheumatological evaluation  There is concern for septic joint at that time  He was seen by orthopedic surgery and had a joint aspiration of his left wrist which was negative for crystals or infection  He was transitioned to oral NSAIDs and discharged home after his pain improved  patient has a prepatellar effusion currently  We'll consult orthopedics for further evaluation  joint aspiration performed orthopedics was negative for gout pseudogout or infection  Given pain and unclear etiology, discussed with medicine team for admission for further evaluation and treatment        Critical Care Time  CritCare Time    Procedures

## 2018-04-11 NOTE — PLAN OF CARE
Problem: Potential for Falls  Goal: Patient will remain free of falls  INTERVENTIONS:  - Assess patient frequently for physical needs  -  Identify cognitive and physical deficits and behaviors that affect risk of falls    -  [place] fall precautions as indicated by assessment   - Educate patient/family on patient safety including physical limitations  - Instruct patient to call for assistance with activity based on assessment  - Modify environment to reduce risk of injury  - Consider OT/PT consult to assist with strengthening/mobility   Outcome: Progressing      Problem: MUSCULOSKELETAL - ADULT  Goal: Maintain or return mobility to safest level of function  INTERVENTIONS:  - Assess patient's ability to carry out ADLs; assess patient's baseline for ADL function and identify physical deficits which impact ability to perform ADLs (bathing, care of mouth/teeth, toileting, grooming, dressing, etc )  - Assess/evaluate cause of self-care deficits   - Assess range of motion  - Assess patient's mobility; develop plan if impaired  - Assess patient's need for assistive devices and provide as appropriate  - Encourage maximum independence but intervene and supervise when necessary  - Involve family in performance of ADLs  - Assess for home care needs following discharge   - Request OT consult to assist with ADL evaluation and planning for discharge  - Provide patient education as appropriate  Outcome: Progressing    Goal: Maintain proper alignment of affected body part  INTERVENTIONS:  - Support, maintain and protect limb and body alignment  - Provide pt/fam with appropriate education  Outcome: Progressing      Problem: DISCHARGE PLANNING  Goal: Discharge to home or other facility with appropriate resources  INTERVENTIONS:  - Identify barriers to discharge w/patient and caregiver  - Arrange for needed discharge resources and transportation as appropriate  - Identify discharge learning needs (meds, wound care, etc )  - Arrange for interpretive services to assist at discharge as needed  - Refer to Case Management Department for coordinating discharge planning if the patient needs post-hospital services based on physician/advanced practitioner order or complex needs related to functional status, cognitive ability, or social support system  Outcome: Progressing      Problem: Knowledge Deficit  Goal: Patient/family/caregiver demonstrates understanding of disease process, treatment plan, medications, and discharge instructions  Complete learning assessment and assess knowledge base    Interventions:  - Provide teaching at level of understanding  - Provide teaching via preferred learning methods  Outcome: Progressing

## 2018-04-11 NOTE — PROGRESS NOTES
IM Residency Progress Note   Unit/Bed#: CW2 215-01 Encounter: [id number]  SOD Team A      Vaibhav Madrid Coad 34 y o  male [id number]    Hospital Stay Days: 1    Assessment/Plan:    Principal Problem:    Pain and swelling of right knee  Active Problems:    Pulmonary embolism (HCC)    Marijuana abuse    Schizophrenia (HCC)    DVT (deep venous thrombosis) (HCC)    Leukocytosis    Right Knee Pain  -Onset 2 days prior to admission, associated with swelling and loss of ROM  Patient denies any trauma  This morning, patient reports that it is radiating to the back of the calf  -Knee XR from 4/9 shows no acute osseous abnormality or soft tissue mass  Evidence of prior Osgood Schlatter's disease  -LE Duplex shows no acute or chronic DVT or superficial thrombophlebitis     -Knee aspiration on 4/9 shows WBC 19541 with 98% neutrophils,   Stat gram stain shows 3+ polys with no bacteria  Culture pending   -Blood cultures from 4/10 shows ngtd x 24 hours  -F/u anaerobic culture and gram stain, GC and chlamydia culture  -Ortho on board, appreciating recs  -Continue vancomycin 1 5g iv bid, day 2  F/u vanc trough  -Pain control with tylenol 975mg po q6h prn mild pain, motrin 800mg po q6h prn moderate pain, oxycodone 5mg po q4h prn breakthrough pain  History of DVT/PE  -Continue xarelto 20mg po daily    Schizophrenia  -Continue abilify 5mg po daily    Leukocytosis  -Resolved  -WBC on admission was 12  Trended downwards since then  Microcytic Anemia  -Hgb baseline ~11   -Iron panel on 4/11: Iron 19, Ferritin 59, TIBC 306, consistent with iron deficiency   -Will order a stool guaic    Disposition: Continue med surg care, pending rheumatology recs     Subjective: This morning patient reports pain is improved in the right knee but now has radiation to the right calf  ROM has somewhat improved, but  with active and passive motion  Denies any other acute complaints  Eating well, voiding w/o difficulty  Hospital Course:  Hesham Beverly is a 34 y o  male with past medical history of DVT/ PE on Xarelto, schizophrenia presented for evaluation of right knee pain  Patient states the right knee has been progressive become worse for the past two days  Pain has become so severe that he has been unable to bear weight on right leg  Pain is worsened by flexion and extension  Patient denies any swelling or erythema around the site  Patient denies any drug use and last recent sexual encounter was a year and a half ago  Denies any trauma or inciting event  Pain is constant and does not radiate anywhere  Patient was previously discharge approximately a week ago for extensive workup of left wrist pain, unrevealing for crystal arthropathies or infection  Rheumatologic investigation only yielded a positive NADER  Review of systems only positive for mild chills and right knee pain  Patient does have some mild pain at the left wrist, however states its improved  Patient denies any fevers, chills, shortness of breath, abdominal pain, N/V, lower extremity swelling, tingling or numbness  All other systems was negative this time  Patient is unsure of any family history of rheumatologic diseases SLE, Ra  Etc     Vitals: Temp (24hrs), Av 2 °F (37 3 °C), Min:99 °F (37 2 °C), Max:99 6 °F (37 6 °C)  Current: Temperature: 99 °F (37 2 °C)  Vitals:    04/10/18 1143 04/10/18 1225 04/10/18 1500 04/10/18 2332   BP: 133/61 114/53 137/75 134/60   BP Location: Right arm Right arm  Right arm   Pulse: 78 78 81 77   Resp:    Temp:  99 °F (37 2 °C) 99 6 °F (37 6 °C) 99 °F (37 2 °C)   TempSrc:  Oral Oral Oral   SpO2: 100% 98% 98% 98%   Weight:  108 kg (238 lb 11 2 oz)     Height:  5' 7" (1 702 m)      Body mass index is 37 39 kg/m²  Physical Exam:  General: lying in bed asleep but easily awakened    HEENT: perrl, eomi  Cardiovascular: reg rate and rhythm  Pulmonary: clear bilaterally  Abdominal: obese belly, soft, nontender  Extremities: no pitting edema  LUE in wrist brace  Right knee tender to palpation right above the tibial tuberosity  No swelling, crepitus, or guarding with patellar slide  Decreased active and passive ROM  No ttp over the right calf  Skin: warm and dry  Neuro: no focal deficits    Intake and Outputs:  I/O last 24 hours: In: 740 [P O :240; IV Piggyback:500]  Out: -   Nutrition:        Diet Orders            Start     Ordered    04/10/18 0039  Diet Regular; Regular House  (ED Bridging Orders Panel)  Diet effective now     Question Answer Comment   Diet Type Regular    Regular Regular House    RD to adjust diet per protocol? Yes        04/10/18 0038        Invasive lines and devices: Invasive Devices     Peripheral Intravenous Line            Peripheral IV 04/10/18 Right Forearm 1 day                 Labs:     Results from last 7 days  Lab Units 04/11/18  0536 04/10/18  0535 04/09/18  1710   WBC Thousand/uL 7 48 9 11 12 16*   HEMOGLOBIN g/dL 11 0* 10 7* 11 7*   HEMATOCRIT % 34 7* 32 2* 35 0*   PLATELETS Thousands/uL 404* 367 435*   NEUTROS PCT %  --   --  72   MONOS PCT %  --   --  12       Results from last 7 days  Lab Units 04/11/18  0536 04/10/18  0535 04/09/18  1710   SODIUM mmol/L 138 139 135*   POTASSIUM mmol/L 3 8 3 9 4 0   CHLORIDE mmol/L 106 108 106   CO2 mmol/L 26 26 25   BUN mg/dL 11 12 10   CREATININE mg/dL 0 88 0 94 1 08   CALCIUM mg/dL 9 1 8 8 9 5   GLUCOSE RANDOM mg/dL 86 85 109         No results found for: PHOS         0  Lab Value Date/Time   TROPONINI 0 13 (H) 02/17/2018 0705   TROPONINI 0 19 (H) 02/16/2018 1722       Results from last 7 days  Lab Units 04/10/18  0535   LACTIC ACID mmol/L 0 7     ABG:No results found for: PHART, EUC1EGB, PO2ART, QHW8GSN, X2MCHAMM, BEART, SOURCE  Imaging: I have personally reviewed pertinent reports  Xr Knee 4+ Vw Right Injury    Result Date: 4/9/2018  Impression: No acute osseous abnormality or soft tissue mass   Workstation performed: [id number] EKG:   Micro:  Lab Results   Component Value Date    BLOODCX No Growth at 24 hrs  04/10/2018    BLOODCX No Growth at 24 hrs  04/10/2018    BLOODCX No Growth After 5 Days  04/07/2018    BLOODCX No Growth After 5 Days   04/07/2018     Allergies: No Known Allergies  Medications:   Scheduled Meds:  Current Facility-Administered Medications:  acetaminophen 975 mg Oral Q6H PRN Sheryle Doles, DO    ARIPiprazole 5 mg Oral Daily Marleny Jocy, DO    ibuprofen 800 mg Oral Q6H PRN Sheryle Doles, DO    oxyCODONE 5 mg Oral Q4H PRN Sheryle Doles, DO    rivaroxaban 20 mg Oral Daily With Breakfast Marleny Jocy, DO    vancomycin 15 mg/kg Intravenous Q12H Pily Edmondson,  Last Rate: Stopped (04/11/18 0101)     Continuous Infusions:   PRN Meds:    acetaminophen 975 mg Q6H PRN   ibuprofen 800 mg Q6H PRN   oxyCODONE 5 mg Q4H PRN       VTE Pharmacologic Prophylaxis: Rivaroxaban (Xarelto)  VTE Mechanical Prophylaxis: sequential compression device    Efren Cabrera MD

## 2018-04-11 NOTE — PLAN OF CARE
Problem: DISCHARGE PLANNING - CARE MANAGEMENT  Goal: Discharge to post-acute care or home with appropriate resources  INTERVENTIONS:  - Conduct assessment to determine patient/family and health care team treatment goals, and need for post-acute services based on payer coverage, community resources, and patient preferences, and barriers to discharge  - Address psychosocial, clinical, and financial barriers to discharge as identified in assessment in conjunction with the patient/family and health care team  - Arrange appropriate level of post-acute services according to patient's   needs and preference and payer coverage in collaboration with the physician and health care team  - Communicate with and update the patient/family, physician, and health care team regarding progress on the discharge plan  - Arrange appropriate transportation to post-acute venues  -Assist patient and family with finding PCP,psychiatirst, rheumatologist with [facility], make referrals as needed    Outcome: Progressing

## 2018-04-11 NOTE — SOCIAL WORK
CM met with patient, explained CM role with introduction, Patient lives with his uncle , he moved from [place] approx 3 months ago, in [address] [facility] with 0STE and full flight of stairs up to second floor  Patient independent with ADL's, ,has no DME, HHC or inpatient rehab experience  Patient does not drive, uncle provides transportation  Patient has no POA  Patient reports history of Schizophrenia, meds were written by psychiatrist in [place], had inpatient p[sych stay in [place]  Patient has no PCP  CM gave [facility] card to patient with explanation , for assistance in finding a PCP and Psychiatrist here in [place]  Primary  is patient's uncle Denita Shown [phone]  Patient reports being on disability  CM reviewed d/c planning process including the following: identifying help at home, patient preference for d/c planning needs, Discharge Lounge, [facility] Meds to Bed program, availability of treatment team to discuss questions or concerns patient and/or family may have regarding understanding medications and recognizing signs and symptoms once discharged  CM also encouraged patient to follow up with all recommended appointments after discharge  Patient advised of importance for patient and family to participate in managing patients medical well being  CM will follow for discharge needs

## 2018-04-12 VITALS
HEIGHT: 67 IN | TEMPERATURE: 99.5 F | RESPIRATION RATE: 18 BRPM | OXYGEN SATURATION: 97 % | BODY MASS INDEX: 37.46 KG/M2 | HEART RATE: 69 BPM | WEIGHT: 238.7 LBS | SYSTOLIC BLOOD PRESSURE: 135 MMHG | DIASTOLIC BLOOD PRESSURE: 63 MMHG

## 2018-04-12 PROBLEM — D72.829 LEUKOCYTOSIS: Status: RESOLVED | Noted: 2018-04-10 | Resolved: 2018-04-12

## 2018-04-12 PROBLEM — I26.99 PULMONARY EMBOLISM (HCC): Status: RESOLVED | Noted: 2018-02-16 | Resolved: 2018-04-12

## 2018-04-12 LAB
B BURGDOR DNA SPEC QL NAA+PROBE: NEGATIVE
CENTROMERE B AB SER-ACNC: <0.2 AI (ref 0–0.9)
CHLAMYDIA DNA CVX QL NAA+PROBE: NORMAL
ENA SCL70 AB SER-ACNC: <0.2 AI (ref 0–0.9)
ENA SS-A AB SER-ACNC: 2.8 AI (ref 0–0.9)
ENA SS-B AB SER-ACNC: <0.2 AI (ref 0–0.9)
N GONORRHOEA DNA GENITAL QL NAA+PROBE: NORMAL

## 2018-04-12 PROCEDURE — 99238 HOSP IP/OBS DSCHRG MGMT 30/<: CPT | Performed by: STUDENT IN AN ORGANIZED HEALTH CARE EDUCATION/TRAINING PROGRAM

## 2018-04-12 PROCEDURE — 86147 CARDIOLIPIN ANTIBODY EA IG: CPT | Performed by: INTERNAL MEDICINE

## 2018-04-12 RX ORDER — FERROUS SULFATE TAB EC 324 MG (65 MG FE EQUIVALENT) 324 (65 FE) MG
324 TABLET DELAYED RESPONSE ORAL
Qty: 60 TABLET | Refills: 0 | Status: SHIPPED | OUTPATIENT
Start: 2018-04-12

## 2018-04-12 RX ORDER — ARIPIPRAZOLE 5 MG/1
5 TABLET ORAL DAILY
Qty: 30 TABLET | Refills: 0 | Status: SHIPPED | OUTPATIENT
Start: 2018-04-13 | End: 2018-04-17 | Stop reason: SDUPTHER

## 2018-04-12 RX ADMIN — VANCOMYCIN HYDROCHLORIDE 1250 MG: 10 INJECTION, POWDER, LYOPHILIZED, FOR SOLUTION INTRAVENOUS at 02:55

## 2018-04-12 RX ADMIN — ARIPIPRAZOLE 5 MG: 5 TABLET ORAL at 08:07

## 2018-04-12 RX ADMIN — RIVAROXABAN 20 MG: 20 TABLET, FILM COATED ORAL at 08:07

## 2018-04-12 NOTE — PROGRESS NOTES
IM Residency Progress Note   Unit/Bed#: CW2 215-01 Encounter: [id number]  SOD Team A      Vaibhav Gallegos 34 y o  male [id number]    Hospital Stay Days: 2    Assessment/Plan:    Principal Problem:    Pain and swelling of right knee  Active Problems:    Pulmonary embolism (HCC)    Marijuana abuse    Schizophrenia (HCC)    DVT (deep venous thrombosis) (HCC)    Leukocytosis    Right Knee Pain  -Onset 2 days prior to admission, associated with swelling and loss of ROM  Patient denies any trauma  Also complains of radiation to the back of the right calf  -Knee XR from 4/9 shows no acute osseous abnormality or soft tissue mass  Evidence of prior Osgood Schlatter's disease  -LE Duplex shows no acute or chronic DVT or superficial thrombophlebitis     -Knee aspiration on 4/9 shows WBC 76878 with 98% neutrophils,   Stat gram stain shows 3+ polys with no bacteria  Culture pending   -Blood cultures from 4/10 shows ngtd x 2 days  GC culture negative, fluid culture and gram stain shows no growth, 3+ polys and no bacteria  -F/u anaerobic culture, GC/chlamydia PCR  -Ortho on board, appreciating recs  -Continue vancomycin 1 5g iv bid, day 3  F/u vanc trough  -Pain control with tylenol 975mg po q6h prn for pain control  He also had ibuprofen and oxycodone prn but has not used any so will d/c  History of DVT/PE  -Continue xarelto 20mg po daily    Schizophrenia  -Continue abilify 5mg po daily    Leukocytosis  -Resolved  -WBC on admission was 12  Trended downwards since then  Microcytic Anemia  -Hgb baseline ~11   -Iron panel on 4/11: Iron 19, Ferritin 59, TIBC 306, consistent with iron deficiency   -Will order a stool guaic   -Continue venofer 200mg iv 3x/week  Will discharge on po iron supplementation and outpatient colonoscopy  Disposition: anticipate discharge today    Subjective:   Yesterday iron panel came back showing severe iron deficiency anemia, so he was started on iv iron   This morning he reports improved pain in his knee, resolution of calf pain  He is eating well, ambulating to the bathroom with a slight limp  Otherwise no acute complaints  Hospital Course:  Harsha Carney is a 34 y o  male with past medical history of DVT/ PE on Xarelto, schizophrenia presented for evaluation of right knee pain  Patient states the right knee has been progressive become worse for the past two days  Pain has become so severe that he has been unable to bear weight on right leg  Pain is worsened by flexion and extension  Patient denies any swelling or erythema around the site  Patient denies any drug use and last recent sexual encounter was a year and a half ago  Denies any trauma or inciting event  Pain is constant and does not radiate anywhere  Patient was previously discharge approximately a week ago for extensive workup of left wrist pain, unrevealing for crystal arthropathies or infection  Rheumatologic investigation only yielded a positive NADER  Review of systems only positive for mild chills and right knee pain  Patient does have some mild pain at the left wrist, however states its improved  Patient denies any fevers, chills, shortness of breath, abdominal pain, N/V, lower extremity swelling, tingling or numbness  All other systems was negative this time  Patient is unsure of any family history of rheumatologic diseases SLE, Ra  Etc     Vitals: Temp (24hrs), Av 5 °F (37 5 °C), Min:99 5 °F (37 5 °C), Max:99 5 °F (37 5 °C)  Current: Temperature: 99 5 °F (37 5 °C)  Vitals:    18 0715 18 1506 18 2300 18 0730   BP: 144/70 132/66 141/89 135/63   BP Location: Left arm Left arm Right arm    Pulse: 81 78 74 69   Resp:  18   Temp: 98 7 °F (37 1 °C) 99 5 °F (37 5 °C)     TempSrc: Oral Oral     SpO2: 98% 98% 98% 97%   Weight:       Height:        Body mass index is 37 39 kg/m²  Physical Exam:  General: lying in bed in no acute distress   maladorous  HEENT: perrl, eomi  Cardiovascular: reg rate and rhythm  Pulmonary: clear bilaterally  Abdominal: obese belly, soft, nontender  Extremities: no pitting edema  Tenderness to palpation over right suprapatellar region  No warmth, effusion, or redness  LUE in brace  Decreased active and passive rom  Skin: no lesions  Neuro: no focal deficits  Intake and Outputs:  I/O last 24 hours: In: 360 [P O :360]  Out: -   Nutrition:        Diet Orders            Start     Ordered    04/10/18 0039  Diet Regular; Regular House  (ED Bridging Orders Panel)  Diet effective now     Question Answer Comment   Diet Type Regular    Regular Regular House    RD to adjust diet per protocol? Yes        04/10/18 0038        Invasive lines and devices: Invasive Devices     Peripheral Intravenous Line            Peripheral IV 04/12/18 Right Hand less than 1 day                 Labs:     Results from last 7 days  Lab Units 04/11/18  0536 04/10/18  0535 04/09/18  1710   WBC Thousand/uL 7 48 9 11 12 16*   HEMOGLOBIN g/dL 11 0* 10 7* 11 7*   HEMATOCRIT % 34 7* 32 2* 35 0*   PLATELETS Thousands/uL 404* 367 435*   NEUTROS PCT %  --   --  72   MONOS PCT %  --   --  12       Results from last 7 days  Lab Units 04/11/18  0536 04/10/18  0535 04/09/18  1710   SODIUM mmol/L 138 139 135*   POTASSIUM mmol/L 3 8 3 9 4 0   CHLORIDE mmol/L 106 108 106   CO2 mmol/L 26 26 25   BUN mg/dL 11 12 10   CREATININE mg/dL 0 88 0 94 1 08   CALCIUM mg/dL 9 1 8 8 9 5   GLUCOSE RANDOM mg/dL 86 85 109         No results found for: PHOS         0  Lab Value Date/Time   TROPONINI 0 13 (H) 02/17/2018 0705   TROPONINI 0 19 (H) 02/16/2018 1722       Results from last 7 days  Lab Units 04/10/18  0535   LACTIC ACID mmol/L 0 7     ABG:No results found for: PHART, DJU4ZVL, PO2ART, QRN2BJE, H7WZTCGJ, BEART, SOURCE  Imaging: I have personally reviewed pertinent reports  Xr Knee 4+ Vw Right Injury    Result Date: 4/9/2018  Impression: No acute osseous abnormality or soft tissue mass   Workstation performed: [id number] EKG:   Micro:  Lab Results   Component Value Date    BLOODCX No Growth at 48 hrs  04/10/2018    BLOODCX No Growth at 48 hrs  04/10/2018    BLOODCX No Growth After 5 Days  04/07/2018    BLOODCX No Growth After 5 Days   04/07/2018     Allergies: No Known Allergies  Medications:   Scheduled Meds:    Current Facility-Administered Medications:  acetaminophen 975 mg Oral Q6H PRN Wilmer Swanson, DO    ARIPiprazole 5 mg Oral Daily Marleny Jocy, DO    ibuprofen 800 mg Oral Q6H PRN Wilmer Swanson, DO    iron sucrose 200 mg Intravenous Once per day on Mon Wed Fri Kelsey Gonzalez MD Last Rate: 200 mg (04/11/18 1644)   oxyCODONE 5 mg Oral Q4H PRN Wilmer Swanson, DO    rivaroxaban 20 mg Oral Daily With Breakfast Marleny Jocy, DO    vancomycin 15 mg/kg (Adjusted) Intravenous Q8H Pily Edmondson,  Last Rate: 1,250 mg (04/12/18 0255)     Continuous Infusions:   PRN Meds:    acetaminophen 975 mg Q6H PRN   ibuprofen 800 mg Q6H PRN   oxyCODONE 5 mg Q4H PRN       VTE Pharmacologic Prophylaxis: Rivaroxaban (Xarelto)  VTE Mechanical Prophylaxis: sequential compression device    Kelsey Gonzalez MD

## 2018-04-12 NOTE — DISCHARGE SUMMARY
[facility] Discharge Summary - Medical [facility] 34 y o  male MRN: [id number]    1 [facility] Room / Bed: DISCHARGE POOL/DISCHARGE* Encounter: [id number]    BRIEF OVERVIEW    Admitting Provider: Marleen Michelle MD  Discharge Provider: Marleen Michelle MD  Primary Care Physician at Discharge: Nickolas Schroeder MD     Discharge To:  home    Admission Date: 4/9/2018     Discharge Date: No discharge date for patient encounter  Primary Discharge Diagnosis  Principal Problem:    Pain and swelling of right knee  Active Problems:    Marijuana abuse    Schizophrenia (HCC)    Iron deficiency anemia  Resolved Problems:    Pulmonary embolism (HCC)    DVT (deep venous thrombosis) (HCC)    Leukocytosis    Consulting Providers   trenton Ortega    Therapeutic Operative Procedures Performed  Knee aspiration    Diagnostic Procedures Performed  Xr Knee 4+ Vw Right Injury    Result Date: 4/9/2018  Impression: No acute osseous abnormality or soft tissue mass   Workstation performed: [id number]       Results from last 7 days  Lab Units 04/11/18  0536 04/10/18  0535 04/09/18  1710   WBC Thousand/uL 7 48 9 11 12 16*   HEMOGLOBIN g/dL 11 0* 10 7* 11 7*   HEMATOCRIT % 34 7* 32 2* 35 0*   PLATELETS Thousands/uL 404* 367 435*   NEUTROS PCT %  --   --  72   MONOS PCT %  --   --  12       Results from last 7 days  Lab Units 04/11/18  0536 04/10/18  0535 04/09/18  1710   SODIUM mmol/L 138 139 135*   POTASSIUM mmol/L 3 8 3 9 4 0   CHLORIDE mmol/L 106 108 106   CO2 mmol/L 26 26 25   BUN mg/dL 11 12 10   CREATININE mg/dL 0 88 0 94 1 08   CALCIUM mg/dL 9 1 8 8 9 5   GLUCOSE RANDOM mg/dL 86 85 109         No results found for: PHOS         0  Lab Value Date/Time   TROPONINI 0 13 (H) 02/17/2018 0705   TROPONINI 0 19 (H) 02/16/2018 1722       Results from last 7 days  Lab Units 04/10/18  0535   LACTIC ACID mmol/L 0 7     ABG:No results found for: PHART, XYK8OYZ, PO2ART, LJP0QEL, D0VBRJUM, BEART, SOURCE    Discharge Disposition: Home/Self Care  Discharged With Lines: no  Test Results Pending at Discharge:   04/12/18 0600  Cardiolipin antibody Morning draw      04/11/18 0947   04/11/18 1405  Occult blood 1-3, stool Once      04/11/18 1404   04/11/18 0958  Sjogren's Antibodies Once      04/11/18 0957   04/11/18 0958  Lupus anticoagulant Once      04/11/18 0957   04/11/18 0957  Anti-scleroderma antibody Once      04/11/18 0957   04/11/18 0957  Beta-2 glycoprotein antibodies Once      04/11/18 0957   04/11/18 0957  Centromere Antibody Once      04/11/18 0957   04/11/18 0957  IgG 1, 2, 3, and 4 Once      04/11/18 0957   04/11/18 0487  Anti-neutrophilic cytoplasmic antibody Once      04/11/18 0957   04/11/18 0956  Cryoglobulin Once      04/11/18 0957   04/11/18 0956  Protein electrophoresis, urine Once      04/11/18 0957   04/10/18 0106  Toxicology screen, urine Once      04/10/18 0105   04/10/18 0034  Chlamydia/GC amplified DNA by PCR Once      04/10/18 0033   04/09/18 1904  Lyme disease, PCR Once           Outpatient Follow-Up  F/u with Dr Farhat Goss at [facility] on 4/17/18 at 8:45 am   Rheumatology clinic will call patient to schedule an appointment for outpatient follow up  Patient will also need a colonoscopy to evaluate for iron deficiency anemia    Follow up with consulting providers    Active Issues Requiring Follow-up   Knee pain    Code Status: Level 1 - Full Code  Advance Directive and Living Will: <no information>  Power of :    POLST:      Medications   Current Discharge Medication List      START taking these medications    Details   ferrous sulfate 324 (65 Fe) mg Take 1 tablet (324 mg total) by mouth 2 (two) times a day before meals  Qty: 60 tablet, Refills: 0    Associated Diagnoses: Iron deficiency anemia           Current Discharge Medication List      CONTINUE these medications which have NOT CHANGED    Details   !! ARIPiprazole (ABILIFY) 5 mg tablet Take 1 tablet (5 mg total) by mouth daily  Qty: 30 tablet, Refills: 1    Associated Diagnoses: Schizophrenia ([facility] [address] )      ! ! rivaroxaban (XARELTO) 20 mg tablet Take 1 tablet (20 mg total) by mouth daily with breakfast  Qty: 30 tablet, Refills: 0    Associated Diagnoses: Other pulmonary embolism with acute cor pulmonale, unspecified chronicity [facility]       ! ! - Potential duplicate medications found  Please discuss with provider  Current Discharge Medication List          Allergies  No Known Allergies  Discharge Diet: regular diet  Activity restrictions: none    [address] [place] [address] Carlos Dixon is a 34 y  o  male with past medical history of DVT/ PE on Xarelto, schizophrenia presented on 4/9 for evaluation of right knee pain  Patient states the right knee has been progressive become worse for the past two days  Pain has become so severe that he has been unable to bear weight on right leg  Pain is worsened by flexion and extension  Patient denies any swelling or erythema around the site  Patient denies any drug use and last recent sexual encounter was a year and a half ago  Denies any trauma or inciting event  Pain is constant and does not radiate anywhere   Patient was previously discharge approximately a week ago for extensive workup of left wrist pain, unrevealing for crystal arthropathies or infection   Rheumatologic investigation only yielded a positive NADER   Review of systems only positive for mild chills and right knee pain  Patient does have some mild pain at the left wrist, however states its improved   Patient denies any fevers, chills, shortness of breath, abdominal pain, N/V, lower extremity swelling, tingling or numbness  All other systems was negative  He was admitted to [place] and orthopedics consulted for knee joint aspiration which was remarkable for WBC 52,000 but otherwise no growth of bacteria  He was started on empiric vancomycin  Blood cultures were sent and came back negative thus far   GC culture also returned negative  Multiple rheumatologic tests were subsequently sent off as well  During his hospitalization, patient's knee pain improved and his leukocytosis resolved  He did complain of radiation to his right calf on 4/11 which subsequently resolved  He was noted to have a microcytic anemia, and an iron panel was sent later that day which came back consistent for severe iron deficiency so he was started on IV venofer  On 4/12, patient was medically cleared for discharge  He was instructed to [facility] at 8:45am, and to await a phone call from the rheumatology clinic for outpatient follow-up  He was also instructed to obtain a colonoscopy to evaluate for his iron deficiency anemia  If his lyme titer comes back negative, then he should be considered for empiric treatment of gonococcus infection  Presenting Problem/History of Present Illness  Principal Problem:    Pain and swelling of right knee  Active Problems:    Marijuana abuse    Schizophrenia (HCC)    Iron deficiency anemia  Resolved Problems:    Pulmonary embolism (HCC)    DVT (deep venous thrombosis) (HCC)    Leukocytosis    Right Knee Pain  -Significantly improved  -Unclear etiology at this time  Differential includes infected joint vs  Rheumatologic vs trauma vs  Muscular/ligament injury   -Weight bearing as tolerated, and tylenol for pain control  -F/u rheumatologic panel, and f/u rheum clinic appointment   -If Lyme panel returns negative, consider empiric treatment for GC      History of DVT/PE  -Continue xarelto 20mg po daily     Schizophrenia  -Continue abilify 5mg po daily     Leukocytosis  -Resolved  Microcytic Anemia  -Hgb baseline ~11   -Iron panel on 4/11: Iron 19, Ferritin 59, TIBC 306, consistent with iron deficiency   -Discharged on oral iron tablets bid   -Will need a colonoscopy to evaluate for occult malignancy      Discharge Condition: good    Discharge  Statement   I spent 30 minutes discharging the patient  This time was spent on the day of discharge  I had direct contact with the patient on the day of discharge  Additional documentation is required if more than 30 minutes were spent on discharge

## 2018-04-13 LAB
B2 GLYCOPROT1 IGA SER-ACNC: <9 GPI IGA UNITS (ref 0–25)
B2 GLYCOPROT1 IGG SER-ACNC: <9 GPI IGG UNITS (ref 0–20)
B2 GLYCOPROT1 IGM SER-ACNC: <9 GPI IGM UNITS (ref 0–32)
BACTERIA SPEC ANAEROBE CULT: NO GROWTH
BACTERIA SPEC BFLD CULT: NO GROWTH
C-ANCA TITR SER IF: ABNORMAL TITER
CARDIOLIPIN IGA SER IA-ACNC: <9 APL U/ML (ref 0–11)
CARDIOLIPIN IGG SER IA-ACNC: <9 GPL U/ML (ref 0–14)
CARDIOLIPIN IGM SER IA-ACNC: <9 MPL U/ML (ref 0–12)
GRAM STN SPEC: NORMAL
GRAM STN SPEC: NORMAL
IGG SERPL-MCNC: 1986 MG/DL (ref 700–1600)
IGG1 SER-MCNC: 1210 MG/DL (ref 248–810)
IGG2 SER-MCNC: 537 MG/DL (ref 130–555)
IGG3 SER-MCNC: 86 MG/DL (ref 15–102)
IGG4 SER-MCNC: 55 MG/DL (ref 2–96)
MYELOPEROXIDASE AB SER IA-ACNC: <9 U/ML (ref 0–9)
P-ANCA ATYPICAL TITR SER IF: ABNORMAL TITER
P-ANCA TITR SER IF: ABNORMAL TITER
PROTEINASE3 AB SER IA-ACNC: 10.9 U/ML (ref 0–3.5)

## 2018-04-13 NOTE — PROGRESS NOTES
Can well tell the patient that some labs are abnormal and he needs to come in for the appointment to follow up   Thanks

## 2018-04-14 LAB
ALBUMIN SERPL ELPH-MCNC: 3.62 G/DL (ref 3.5–5)
ALBUMIN SERPL ELPH-MCNC: 45.3 % (ref 52–65)
ALBUMIN UR ELPH-MCNC: 100 %
ALPHA1 GLOB MFR UR ELPH: 0 %
ALPHA1 GLOB SERPL ELPH-MCNC: 0.47 G/DL (ref 0.1–0.4)
ALPHA1 GLOB SERPL ELPH-MCNC: 5.9 % (ref 2.5–5)
ALPHA2 GLOB MFR UR ELPH: 0 %
ALPHA2 GLOB SERPL ELPH-MCNC: 1.03 G/DL (ref 0.4–1.2)
ALPHA2 GLOB SERPL ELPH-MCNC: 12.9 % (ref 7–13)
B-GLOBULIN MFR UR ELPH: 0 %
BETA GLOB ABNORMAL SERPL ELPH-MCNC: 0.42 G/DL (ref 0.4–0.8)
BETA1 GLOB SERPL ELPH-MCNC: 5.3 % (ref 5–13)
BETA2 GLOB SERPL ELPH-MCNC: 6.9 % (ref 2–8)
BETA2+GAMMA GLOB SERPL ELPH-MCNC: 0.55 G/DL (ref 0.2–0.5)
GAMMA GLOB ABNORMAL SERPL ELPH-MCNC: 1.9 G/DL (ref 0.5–1.6)
GAMMA GLOB MFR UR ELPH: 0 %
GAMMA GLOB SERPL ELPH-MCNC: 23.7 % (ref 12–22)
IGG/ALB SER: 0.83 {RATIO} (ref 1.1–1.8)
PROT PATTERN UR ELPH-IMP: ABNORMAL
PROT SERPL-MCNC: 8 G/DL (ref 6.4–8.2)
PROT UR-MCNC: 23 MG/DL

## 2018-04-15 LAB
APTT HEX PL PPP: 0 SEC (ref 0–11)
APTT SCREEN TO CONFIRM RATIO: 1.18 RATIO (ref 0–1.4)
APTT-LA IMM 4:1 NP PPP: 49.5 SEC (ref 0–48.9)
BACTERIA BLD CULT: NORMAL
BACTERIA BLD CULT: NORMAL
CONFIRM APTT/NORMAL: 55.5 SEC (ref 0–55)
DRVVT IMM 1:2 NP PPP: 45.7 SEC (ref 0–47)
LA PPP-IMP: ABNORMAL
SCREEN APTT: 52.3 SEC (ref 0–51.9)
SCREEN DRVVT: 58.6 SEC (ref 0–47)
THROMBIN TIME: 15.7 SEC (ref 0–23)

## 2018-04-16 LAB — CRYOGLOB SER QL 1D COLD INC: NORMAL

## 2018-04-17 ENCOUNTER — OFFICE VISIT (OUTPATIENT)
Dept: INTERNAL MEDICINE CLINIC | Facility: CLINIC | Age: 33
End: 2018-04-17
Payer: MEDICARE

## 2018-04-17 VITALS
TEMPERATURE: 98.3 F | DIASTOLIC BLOOD PRESSURE: 70 MMHG | HEIGHT: 67 IN | WEIGHT: 234.57 LBS | BODY MASS INDEX: 36.82 KG/M2 | SYSTOLIC BLOOD PRESSURE: 160 MMHG | HEART RATE: 96 BPM

## 2018-04-17 DIAGNOSIS — M25.561 PAIN AND SWELLING OF RIGHT KNEE: ICD-10-CM

## 2018-04-17 DIAGNOSIS — D50.9 IRON DEFICIENCY ANEMIA: Primary | ICD-10-CM

## 2018-04-17 DIAGNOSIS — Z23 NEED FOR INFLUENZA VACCINATION: ICD-10-CM

## 2018-04-17 DIAGNOSIS — M25.532 PAIN AND SWELLING OF LEFT WRIST: Primary | ICD-10-CM

## 2018-04-17 DIAGNOSIS — M25.461 PAIN AND SWELLING OF RIGHT KNEE: ICD-10-CM

## 2018-04-17 DIAGNOSIS — Z23 NEED FOR TDAP VACCINATION: ICD-10-CM

## 2018-04-17 DIAGNOSIS — M25.432 PAIN AND SWELLING OF LEFT WRIST: ICD-10-CM

## 2018-04-17 DIAGNOSIS — M25.432 PAIN AND SWELLING OF LEFT WRIST: Primary | ICD-10-CM

## 2018-04-17 DIAGNOSIS — M25.532 PAIN AND SWELLING OF LEFT WRIST: ICD-10-CM

## 2018-04-17 PROCEDURE — 99213 OFFICE O/P EST LOW 20 MIN: CPT | Performed by: INTERNAL MEDICINE

## 2018-04-17 NOTE — PROGRESS NOTES
CLINIC VISIT NOTE    ASSESSMENT/PLAN:  Problem List Items Addressed This Visit        Other    Pain and swelling of left wrist    Relevant Orders    Ambulatory referral to Rheumatology    Pain and swelling of right knee    Relevant Orders    Ambulatory referral to Rheumatology    Iron deficiency anemia      Other Visit Diagnoses     Need for Tdap vaccination    -  Primary    Need for influenza vaccination            Pain and swelling right knee/left wrist-positive rheumatologic workup for lupus anticoagulant, PTT LE mixing study, IgG, Sjogren antibodies, protein electrophoresis, anti neutrophilic cytoplasmic antibody  - we will refer patient to Rheumatology    Iron deficiency anemia- patient was provided with Fecal occult blood test immunochemical - FIT kit     Health Maintenance: patient is refusing vaccinations    Schedule a follow-up appointment in as needed basis  CHIEF COMPLAINT: none    HISTORY OF PRESENT ILLNESS:  Patient is a 34years old male with past medical history of schizophrenia, marijuana abuse, iron deficiency anemia who comes for follow-up after hospitalization for pain and swelling of the left wrist and right knee  While in the hospital patient had extensive rheumatologic workup done  This time patient admits to mild right knee pain with ambulation  He denies any difficulties with his left wrist   He uses a wrist splint with good response  Review of Systems   Constitutional: Negative for chills, fatigue and fever  HENT: Negative for congestion, ear discharge, ear pain, postnasal drip and sore throat  Respiratory: Negative for cough, chest tightness and shortness of breath  Cardiovascular: Negative for chest pain and leg swelling  Gastrointestinal: Negative for abdominal pain, blood in stool and constipation  Genitourinary: Negative for dysuria  Musculoskeletal: Positive for arthralgias  Neurological: Negative for dizziness, light-headedness and headaches  Psychiatric/Behavioral: Negative for behavioral problems  The patient is not nervous/anxious  OBJECTIVE:  Vitals:    04/17/18 0842   BP: 160/70   BP Location: Right arm   Patient Position: Sitting   Cuff Size: Adult   Pulse: 96   Temp: 98 3 °F (36 8 °C)   TempSrc: Oral   Weight: 106 kg (234 lb 9 1 oz)   Height: 5' 7" (1 702 m)     Physical Exam   Constitutional: He appears well-developed and well-nourished  HENT:   Head: Normocephalic and atraumatic  Eyes: Conjunctivae are normal  Pupils are equal, round, and reactive to light  Right eye exhibits no discharge  Left eye exhibits no discharge  No scleral icterus  Neck: Neck supple  Cardiovascular: Normal rate and regular rhythm  No murmur heard  Pulmonary/Chest: Effort normal and breath sounds normal    Abdominal: Soft  He exhibits no distension  There is no tenderness  Musculoskeletal: He exhibits no edema  Wrist splint on left wrist,   - painful right knee to flexion/extension   Neurological: He is alert  Skin: Skin is warm and dry  Psychiatric: He has a normal mood and affect  Current Outpatient Prescriptions:     ARIPiprazole (ABILIFY) 5 mg tablet, Take 1 tablet (5 mg total) by mouth daily, Disp: 30 tablet, Rfl: 1    ferrous sulfate 324 (65 Fe) mg, Take 1 tablet (324 mg total) by mouth 2 (two) times a day before meals, Disp: 60 tablet, Rfl: 0    rivaroxaban (XARELTO) 20 mg tablet, Take 1 tablet (20 mg total) by mouth daily with breakfast, Disp: 30 tablet, Rfl: 0    Past Medical History:   Diagnosis Date    History of DVT of lower extremity     History of pulmonary embolism     Iron deficiency anemia     Schizophrenia (HCC)      Past Surgical History:   Procedure Laterality Date    VASCULAR SURGERY       Social History     Social History    Marital status: Single     Spouse name: N/A    Number of children: N/A    Years of education: N/A     Occupational History    Not on file       Social History Main Topics    Smoking status: Former Smoker     Types: Cigarettes    Smokeless tobacco: Never Used      Comment: pt "quit over 15 years ago"    Alcohol use Yes      Comment: former user    Drug use: Yes     Types: Marijuana    Sexual activity: No     Other Topics Concern    Not on file     Social History Narrative    No narrative on file     Family History   Problem Relation Age of Onset    No Known Problems Mother     No Known Problems Father     Hypertension Maternal Grandmother        ==  Stephanie Figueroa MD    [facility]  [address]   [facility] , [address] [address] [facility] [facility]  Office: [phone]  Fax: [phone]

## 2018-04-18 ENCOUNTER — TELEPHONE (OUTPATIENT)
Dept: MULTI SPECIALTY CLINIC | Facility: CLINIC | Age: 33
End: 2018-04-18

## 2018-04-21 LAB
AMPHETAMINES UR QL SCN: NEGATIVE NG/ML
BARBITURATES UR QL SCN: NEGATIVE NG/ML
BENZODIAZ UR QL SCN: NEGATIVE NG/ML
BZE UR QL: NEGATIVE NG/ML
CANNABINOIDS UR QL SCN: POSITIVE
METHADONE UR QL SCN: NEGATIVE NG/ML
OPIATES UR QL: NEGATIVE NG/ML
PCP UR QL: NEGATIVE NG/ML
PROPOXYPH UR QL: NEGATIVE NG/ML

## 2018-04-30 ENCOUNTER — HOSPITAL ENCOUNTER (OUTPATIENT)
Facility: HOSPITAL | Age: 33
Setting detail: OBSERVATION
Discharge: LEFT BEFORE DISCHARGE | End: 2018-04-30
Attending: EMERGENCY MEDICINE | Admitting: INTERNAL MEDICINE
Payer: MEDICARE

## 2018-04-30 VITALS
DIASTOLIC BLOOD PRESSURE: 71 MMHG | WEIGHT: 230 LBS | TEMPERATURE: 98 F | BODY MASS INDEX: 36.02 KG/M2 | OXYGEN SATURATION: 100 % | SYSTOLIC BLOOD PRESSURE: 132 MMHG | HEART RATE: 89 BPM | RESPIRATION RATE: 16 BRPM

## 2018-04-30 DIAGNOSIS — F10.929 ALCOHOL INTOXICATION (HCC): Primary | ICD-10-CM

## 2018-04-30 LAB
AMPHETAMINES SERPL QL SCN: NEGATIVE
ANION GAP SERPL CALCULATED.3IONS-SCNC: 5 MMOL/L (ref 4–13)
APAP SERPL-MCNC: <2 UG/ML (ref 10–30)
APTT PPP: 38 SECONDS (ref 23–35)
BARBITURATES UR QL: NEGATIVE
BASOPHILS # BLD AUTO: 0.08 THOUSANDS/ΜL (ref 0–0.1)
BASOPHILS NFR BLD AUTO: 1 % (ref 0–1)
BENZODIAZ UR QL: NEGATIVE
BUN SERPL-MCNC: 7 MG/DL (ref 5–25)
CALCIUM SERPL-MCNC: 9.2 MG/DL (ref 8.3–10.1)
CHLORIDE SERPL-SCNC: 102 MMOL/L (ref 100–108)
CO2 SERPL-SCNC: 30 MMOL/L (ref 21–32)
COCAINE UR QL: NEGATIVE
CREAT SERPL-MCNC: 1 MG/DL (ref 0.6–1.3)
EOSINOPHIL # BLD AUTO: 0.82 THOUSAND/ΜL (ref 0–0.61)
EOSINOPHIL NFR BLD AUTO: 13 % (ref 0–6)
ERYTHROCYTE [DISTWIDTH] IN BLOOD BY AUTOMATED COUNT: 13.7 % (ref 11.6–15.1)
ETHANOL SERPL-MCNC: 403 MG/DL (ref 0–3)
GFR SERPL CREATININE-BSD FRML MDRD: 114 ML/MIN/1.73SQ M
GLUCOSE SERPL-MCNC: 89 MG/DL (ref 65–140)
HCT VFR BLD AUTO: 34.3 % (ref 36.5–49.3)
HGB BLD-MCNC: 10.7 G/DL (ref 12–17)
INR PPP: 1.27 (ref 0.86–1.16)
LYMPHOCYTES # BLD AUTO: 1.6 THOUSANDS/ΜL (ref 0.6–4.47)
LYMPHOCYTES NFR BLD AUTO: 26 % (ref 14–44)
MCH RBC QN AUTO: 24.8 PG (ref 26.8–34.3)
MCHC RBC AUTO-ENTMCNC: 31.2 G/DL (ref 31.4–37.4)
MCV RBC AUTO: 79 FL (ref 82–98)
METHADONE UR QL: NEGATIVE
MONOCYTES # BLD AUTO: 0.76 THOUSAND/ΜL (ref 0.17–1.22)
MONOCYTES NFR BLD AUTO: 12 % (ref 4–12)
NEUTROPHILS # BLD AUTO: 2.96 THOUSANDS/ΜL (ref 1.85–7.62)
NEUTS SEG NFR BLD AUTO: 48 % (ref 43–75)
NRBC BLD AUTO-RTO: 0 /100 WBCS
OPIATES UR QL SCN: NEGATIVE
PCP UR QL: NEGATIVE
PLATELET # BLD AUTO: 497 THOUSANDS/UL (ref 149–390)
PMV BLD AUTO: 8.4 FL (ref 8.9–12.7)
POTASSIUM SERPL-SCNC: 4.3 MMOL/L (ref 3.5–5.3)
PROTHROMBIN TIME: 16 SECONDS (ref 12.1–14.4)
RBC # BLD AUTO: 4.32 MILLION/UL (ref 3.88–5.62)
SALICYLATES SERPL-MCNC: <3 MG/DL (ref 3–20)
SODIUM SERPL-SCNC: 137 MMOL/L (ref 136–145)
THC UR QL: NEGATIVE
WBC # BLD AUTO: 6.24 THOUSAND/UL (ref 4.31–10.16)

## 2018-04-30 PROCEDURE — 36415 COLL VENOUS BLD VENIPUNCTURE: CPT | Performed by: EMERGENCY MEDICINE

## 2018-04-30 PROCEDURE — 80320 DRUG SCREEN QUANTALCOHOLS: CPT | Performed by: EMERGENCY MEDICINE

## 2018-04-30 PROCEDURE — 85730 THROMBOPLASTIN TIME PARTIAL: CPT | Performed by: EMERGENCY MEDICINE

## 2018-04-30 PROCEDURE — 99283 EMERGENCY DEPT VISIT LOW MDM: CPT

## 2018-04-30 PROCEDURE — 85610 PROTHROMBIN TIME: CPT | Performed by: EMERGENCY MEDICINE

## 2018-04-30 PROCEDURE — 99234 HOSP IP/OBS SM DT SF/LOW 45: CPT | Performed by: INTERNAL MEDICINE

## 2018-04-30 PROCEDURE — 80048 BASIC METABOLIC PNL TOTAL CA: CPT | Performed by: EMERGENCY MEDICINE

## 2018-04-30 PROCEDURE — 80329 ANALGESICS NON-OPIOID 1 OR 2: CPT | Performed by: EMERGENCY MEDICINE

## 2018-04-30 PROCEDURE — 80307 DRUG TEST PRSMV CHEM ANLYZR: CPT | Performed by: EMERGENCY MEDICINE

## 2018-04-30 PROCEDURE — 96360 HYDRATION IV INFUSION INIT: CPT

## 2018-04-30 PROCEDURE — 85025 COMPLETE CBC W/AUTO DIFF WBC: CPT | Performed by: EMERGENCY MEDICINE

## 2018-04-30 RX ORDER — SODIUM CHLORIDE 9 MG/ML
75 INJECTION, SOLUTION INTRAVENOUS CONTINUOUS
Status: DISCONTINUED | OUTPATIENT
Start: 2018-04-30 | End: 2018-04-30

## 2018-04-30 RX ORDER — ACETAMINOPHEN 325 MG/1
650 TABLET ORAL EVERY 6 HOURS PRN
Status: DISCONTINUED | OUTPATIENT
Start: 2018-04-30 | End: 2018-04-30 | Stop reason: HOSPADM

## 2018-04-30 RX ORDER — FERROUS SULFATE 325(65) MG
325 TABLET ORAL 2 TIMES DAILY WITH MEALS
Status: DISCONTINUED | OUTPATIENT
Start: 2018-04-30 | End: 2018-04-30 | Stop reason: HOSPADM

## 2018-04-30 RX ORDER — ARIPIPRAZOLE 5 MG/1
5 TABLET ORAL DAILY
Status: DISCONTINUED | OUTPATIENT
Start: 2018-04-30 | End: 2018-04-30 | Stop reason: HOSPADM

## 2018-04-30 RX ORDER — THIAMINE MONONITRATE (VIT B1) 100 MG
100 TABLET ORAL DAILY
Status: DISCONTINUED | OUTPATIENT
Start: 2018-04-30 | End: 2018-04-30 | Stop reason: HOSPADM

## 2018-04-30 RX ORDER — FOLIC ACID 1 MG/1
1 TABLET ORAL DAILY
Status: DISCONTINUED | OUTPATIENT
Start: 2018-04-30 | End: 2018-04-30 | Stop reason: HOSPADM

## 2018-04-30 RX ADMIN — FOLIC ACID 1 MG: 1 TABLET ORAL at 09:12

## 2018-04-30 RX ADMIN — SODIUM CHLORIDE 1000 ML: 0.9 INJECTION, SOLUTION INTRAVENOUS at 01:29

## 2018-04-30 RX ADMIN — ARIPIPRAZOLE 5 MG: 5 TABLET ORAL at 09:12

## 2018-04-30 RX ADMIN — SODIUM CHLORIDE 1000 ML: 0.9 INJECTION, SOLUTION INTRAVENOUS at 10:23

## 2018-04-30 RX ADMIN — Medication 100 MG: at 09:12

## 2018-04-30 NOTE — ED NOTES
Pt standing in hallway [facility], refusing to return to his room  Security called and pt escorted back to room and changed into hospital attire  Pt provided breakfast and room reassigned to ED room1 for closer observation  Pt appears to be under the influence of drugs and alcohol  Restraints applied to bed incase patient becomes violent       Paula Yip RN  04/30/18 8064

## 2018-04-30 NOTE — H&P
INTERNAL MEDICINE HISTORY AND PHYSICAL  ED 05 SOD Team A    NAME: Dagoberto Ochoa  AGE: 34 y o  SEX: male  : 1985   MRN: [id number]  ENCOUNTER: [id number]    DATE: 2018  TIME: 3:04 AM    Primary Care Physician: Britney Chester DO  Admitting Provider: Matthew Westbrook MD    Chief complaint: Intoxication     History of Present Illness     Dagoberto Ochoa is a 34 y o  male with a past medical history DVT/PE on Xarelto, recent work-up for suspected rheumatologic disorder presenting with acute intoxication  The patient presented to the ED acutely intoxicated  Per ED notes he was brought to the ED by EMS as he was noted to be walking around barefoot drinking "cigarette butt juice" (?) and at the time admitted to heroin use and synthetic marijuana  Ethanol lvl 403, rapid drug screen pending  Further history in ED limited as patient acutely intoxicated, though patient comfortable-appearing smiling articulating clearly  Denies chest pain/SOB/na/vom/abdominal pain/change in urine/stools  No complaints  Of note, the patient was recently admitted for work-up for joint-swelling concerning for rheumatologic disorder  On follow-up in clinic 18 with positive lupus AC, PTT LE mixing study, Sjogren antibodies, ANCA, referred to rheumatology  In ED afebrile HR 80s SBP 130s satting well on RA  Ethanol level 403 as above, coma panel otherwise negative  BMP unremarkable, CBC with hgb 10 7 at baseline  Review of Systems   Review of Systems   Constitutional: Negative for fever  HENT: Negative for sore throat  Respiratory: Negative for shortness of breath  Cardiovascular: Negative for chest pain, palpitations and leg swelling  Gastrointestinal: Negative for abdominal pain, diarrhea, nausea and vomiting  Genitourinary: Negative for dysuria  Musculoskeletal: Negative for back pain  Skin: Negative for pallor and rash  Neurological: Negative for dizziness, weakness, numbness and headaches         Past Medical History     Past Medical History:   Diagnosis Date    History of DVT of lower extremity     History of pulmonary embolism     Iron deficiency anemia     Schizophrenia ([facility] [address] )        Past Surgical History     Past Surgical History:   Procedure Laterality Date    VASCULAR SURGERY         Social History     History   Alcohol Use    Yes     History   Drug Use    Types: Marijuana, Heroin     History   Smoking Status    Current Every Day Smoker    Types: Cigarettes   Smokeless Tobacco    Never Used       Family History     Family History   Problem Relation Age of Onset    No Known Problems Mother     No Known Problems Father     Hypertension Maternal Grandmother        Medications Prior to Admission     Prior to Admission medications    Medication Sig Start Date End Date Taking? Authorizing Provider   ARIPiprazole (ABILIFY) 5 mg tablet Take 1 tablet (5 mg total) by mouth daily 3/24/18   Leroy Samson MD   ferrous sulfate 324 (65 Fe) mg Take 1 tablet (324 mg total) by mouth 2 (two) times a day before meals 4/12/18   Thanh Parmar MD   rivaroxaban (XARELTO) 20 mg tablet Take 1 tablet (20 mg total) by mouth daily with breakfast 3/11/18   Kaykay Wilcox MD       Allergies   No Known Allergies    Objective     Vitals:    04/30/18 0107   BP: 136/61   Pulse: 80   Resp: 20   Temp: 98 °F (36 7 °C)   TempSrc: Oral   SpO2: 98%   Weight: 104 kg (230 lb)     Body mass index is 36 02 kg/m²  No intake or output data in the 24 hours ending 04/30/18 0304  Invasive Devices     Peripheral Intravenous Line            Peripheral IV 04/30/18 Left Antecubital less than 1 day                Physical Exam  GENERAL: Appears well-developed and well-nourished  Appears in no acute distress   HEENT: Normocephalic and atraumatic  No scleral icterus  PERRLA  EOMI B/L  No oropharyngeal edema  MM moist    NECK: Neck supple with no lymphadenopathy  Trachea midline  No JVD  CARDIOVASCULAR: S1 and S2 are present    Regular rate and rhythm  No murmurs, rubs, or gallops  RESPIRATORY: CTA B/L, no rales, rhonci or wheezes  Normal respiratory expansion  ABDOMINAL: Bowel sounds present in all 4 quadrants, non-tender, soft, non-distended  No organomegaly, rebound, or guarding  EXTREMITIES: 2+ DP and PT pulses bilaterally; no cyanosis, clubbing, edema  ROM intact  UMANZOR x4   MUSCULOSKELETAL: No joint tenderness, deformity or swelling, full range of motion without pain  NEUROLOGIC: Patient intoxicated  No sensory or motor deficits  CN 2-12 intact  Plantars downgoing bilaterally  Speech fluent  SKIN: Skin is warm and dry  No skin lesions are present  No rashes  PSYCHIATRIC: Normal mood and affect     Lab Results: I have personally reviewed pertinent reports  Imaging: I have personally reviewed pertinent films in PACS  Xr Wrist 3+ Views Left    Result Date: 4/2/2018  Narrative: LEFT WRIST INDICATION:   Severe localized left wrist pain and swelling  COMPARISON:  None VIEWS:  XR WRIST 3+ VW LEFT Images: 4 FINDINGS: There is no acute fracture or dislocation  No significant degenerative changes  No lytic or blastic lesions are seen  Mild dorsal soft tissue swelling along the hand  Impression: No acute osseous abnormality  Mild dorsal soft tissue swelling  Workstation performed: [id number]     Xr Knee 4+ Vw Right Injury    Result Date: 4/9/2018  Narrative: RIGHT KNEE INDICATION:   knee pain  COMPARISON:  None VIEWS:  XR KNEE 4+ VW RIGHT INJURY FINDINGS: There is no acute fracture or dislocation  There is no joint effusion  Evidence of prior Hospital Schlatter's disease    No lytic or blastic lesions are seen  Soft tissues are unremarkable  Impression: No acute osseous abnormality or soft tissue mass  Workstation performed: [id number]     Vas Upper Limb Venous Duplex Scan, Unilateral/limited    Result Date: 4/2/2018  Narrative:  [facility] REPORT CLINICAL: Indications:  Left Edema, unspecified [R60 9]   Limb Pain [A96 421]  Symptoms of left wrist X several days  Physician wants to rule out deep and superficial thrombophlebitis  Risk Factors:  PE, DVT, schizophrenia, prior cigarette smoking, current marijuana use  Height:  67 inches  Weight:  230 lbs  Clinical: Left Upper Limb There is complaint of pain, edema, tenderness, and inflammation  FINDINGS:  Segment       Right            Left                        Impression       Impression       Int  Jugular  Normal (Patent)  Normal (Patent)     CONCLUSION:  Impression  RIGHT UPPER LIMB LIMITED: Evaluation shows no evidence of thrombus in the internal jugular vein, subclavian vein, and the brachiocephalic vein  LEFT UPPER LIMB: No evidence of acute deep vein thrombosis  No evidence of superficial thrombophlebitis noted  Doppler evaluation shows a normal response to augmentation maneuvers  SIGNATURE: Electronically Signed by: Renita Hanley MD on 2018-04-02 06:16:54 PM    Vas Lower Limb Venous Duplex Study, Unilateral/limited    Result Date: 4/10/2018  Narrative:  [facility] REPORT CLINICAL: Indications: Localized edema [R60 0]  Patient presents with right lower extremity pain x 2 days  Clinical:   CONCLUSION: Impression: RIGHT LOWER LIMB No evidence of acute or chronic deep vein thrombosis   No evidence of superficial thrombophlebitis noted  Doppler evaluation shows a normal response to augmentation maneuvers  Popliteal, posterior tibial and anterior tibial arterial Doppler waveforms are triphasic  LEFT LOWER LIMB LIMITED Evaluation shows no evidence of thrombus in the common femoral vein  Doppler evaluation shows a normal response to augmentation maneuvers    Technical findings were given to Dr Ernst Anaya: Electronically Signed by: Breann Moody MD, [address] on 2018-04-10 06:39:15 AM      Microbiology: cultures obtained in emergency department include     Urinalysis:       Invalid input(s): URIBILINOGEN     Urine Micro:        EKG, Pathology, and Other Studies: I have personally reviewed pertinent reports  Medications given in Emergency Department     Medication Administration - last 24 hours from 04/29/2018 0304 to 04/30/2018 0304       Date/Time Order Dose Route Action Action by     04/30/2018 0129 sodium chloride 0 9 % bolus 1,000 mL 1,000 mL Intravenous New Bag Tony Servin RN          Assessment and Plan     34 y o  male with a past medical history DVT/PE on Xarelto, recent work-up for suspected rheumatologic disorder presenting with acute intoxication  Alcohol intoxication: In ED ethanol level 403  No other prior admissions for alcohol intoxication in our system  Unclear if prior w/d sx/w/d seizures  Possible concurrent heroin use, though denying currently  · Thiamine/folate  · Follow-up UDS  · Follow-up BAL 1PM  · Defer to day team HOST referral     Rheumatologic d/o: Undifferentiated rheumatologic disorder per recent labs, patient previously referred to rheumatology for follow-up   · Address follow-up in AM    Hx DVT/PE:   · Continue home Xarelto     Iron-deficiency anemia:  · Continue home ferrous sulfate    Code Status: Level 1 - Full Code  VTE Pharmacologic Prophylaxis: Reason for no pharmacologic prophylaxis On Xarelto    VTE Mechanical Prophylaxis: sequential compression device  Admission Status: OBSERVATION    Admission Time  I spent 30 minutes admitting the patient  This involved direct patient contact where I performed a full history and physical, reviewing previous records, and reviewing laboratory and other diagnostic studies      Cody Vegas MD  Internal Medicine  PGY-1

## 2018-04-30 NOTE — Clinical Note
Case was discussed with sod and the patient's admission status was agreed to be Admission Status: observation status to the service of Dr Brenda Jones

## 2018-04-30 NOTE — ED PROVIDER NOTES
History  Chief Complaint   Patient presents with    Recreational Drug Use     as per ems - patient found walking around barefoot, found on someone's front porch drinking cigarette butt juice  patient admitted to using herion, spice     HPI  This is a 33 yo M who presents today with recreational drug use  Patient was found walking barefoot near a stranger's house  He was on the front porch drinking cigarette butt juice and admitted to heroin and spice  Brought in by ems and police  Patient is alert and oriented to place and person  States he was having a good time  Denies any trauma  Denies headache, neck pain, chest pain, shrotness of breath, abdominal pain  Impression: 33 yo M presents with recreational drug use  Most likely drug ingestion but will get labs  Attempt to call family   Prior to Admission Medications   Prescriptions Last Dose Informant Patient Reported? Taking? ARIPiprazole (ABILIFY) 5 mg tablet  Self No No   Sig: Take 1 tablet (5 mg total) by mouth daily   ferrous sulfate 324 (65 Fe) mg  Self No No   Sig: Take 1 tablet (324 mg total) by mouth 2 (two) times a day before meals   rivaroxaban (XARELTO) 20 mg tablet  Self No No   Sig: Take 1 tablet (20 mg total) by mouth daily with breakfast      Facility-Administered Medications: None       Past Medical History:   Diagnosis Date    History of DVT of lower extremity     History of pulmonary embolism     Iron deficiency anemia     Schizophrenia (HCC)        Past Surgical History:   Procedure Laterality Date    VASCULAR SURGERY         Family History   Problem Relation Age of Onset    No Known Problems Mother     No Known Problems Father     Hypertension Maternal Grandmother      I have reviewed and agree with the history as documented      Social History   Substance Use Topics    Smoking status: Current Every Day Smoker     Types: Cigarettes    Smokeless tobacco: Never Used    Alcohol use Yes        Review of Systems   Constitutional: Negative  Negative for diaphoresis and fever  HENT: Negative  Respiratory: Negative  Negative for cough, shortness of breath and wheezing  Cardiovascular: Negative  Negative for chest pain, palpitations and leg swelling  Gastrointestinal: Negative for abdominal distention, abdominal pain, nausea and vomiting  Genitourinary: Negative  Musculoskeletal: Negative  Skin: Negative  Neurological: Negative  Psychiatric/Behavioral: Positive for hallucinations  All other systems reviewed and are negative  Physical Exam  ED Triage Vitals   Temperature Pulse Respirations Blood Pressure SpO2   04/30/18 0107 04/30/18 0107 04/30/18 0107 04/30/18 0107 04/30/18 0107   98 °F (36 7 °C) 80 20 136/61 98 %      Temp Source Heart Rate Source Patient Position - Orthostatic VS BP Location FiO2 (%)   04/30/18 0107 04/30/18 0107 04/30/18 0330 04/30/18 0330 --   Oral Monitor Lying Right arm       Pain Score       04/30/18 0107       No Pain           Orthostatic Vital Signs  Vitals:    04/30/18 0330 04/30/18 0620 04/30/18 0810 04/30/18 1000   BP: 120/57  124/60 132/71   Pulse: 88 67 90 89   Patient Position - Orthostatic VS: Lying  Sitting        Physical Exam   Constitutional: He appears well-developed and well-nourished  No distress  HENT:   Head: Normocephalic and atraumatic  Nose: Nose normal    Mouth/Throat: Oropharynx is clear and moist    Eyes: Conjunctivae and EOM are normal  Pupils are equal, round, and reactive to light  Neck: Normal range of motion  Neck supple  Cardiovascular: Normal rate, regular rhythm and normal heart sounds  No murmur heard  Pulmonary/Chest: Effort normal and breath sounds normal  No respiratory distress  He has no wheezes  He has no rales  Abdominal: Soft  Bowel sounds are normal  He exhibits no distension  There is no tenderness  There is no rebound and no guarding  Musculoskeletal: Normal range of motion  He exhibits no edema, tenderness or deformity  Neurological: He is alert  No cranial nerve deficit  Moving all extremities   Skin: Skin is warm and dry  No rash noted  He is not diaphoretic  No pallor  Psychiatric: He has a normal mood and affect  His speech is normal  He is actively hallucinating  Vitals reviewed  ED Medications  Medications   sodium chloride 0 9 % bolus 1,000 mL (0 mL Intravenous Stopped 4/30/18 0333)   sodium chloride 0 9 % bolus 1,000 mL (0 mL Intravenous Stopped 4/30/18 1059)       Diagnostic Studies  Results Reviewed     Procedure Component Value Units Date/Time    Rapid drug screen, urine [[id number]]  (Normal) Collected:  04/30/18 1031    Lab Status:  Final result Specimen:  Urine from Urine, Clean Catch Updated:  04/30/18 1103     Amph/Meth UR Negative     Barbiturate Ur Negative     Benzodiazepine Urine Negative     Cocaine Urine Negative     Methadone Urine Negative     Opiate Urine Negative     PCP Ur Negative     THC Urine Negative    Narrative:         FOR MEDICAL PURPOSES ONLY  IF CONFIRMATION NEEDED PLEASE CONTACT THE LAB WITHIN 5 DAYS      Drug Screen Cutoff Levels:  AMPHETAMINE/METHAMPHETAMINES  1000 ng/mL  BARBITURATES     200 ng/mL  BENZODIAZEPINES     200 ng/mL  COCAINE      300 ng/mL  METHADONE      300 ng/mL  OPIATES      300 ng/mL  PHENCYCLIDINE     25 ng/mL  THC       50 ng/mL    Ethanol [[id number]]  (Abnormal) Collected:  04/30/18 0129    Lab Status:  Final result Specimen:  Blood from Arm, Left Updated:  04/30/18 0225     Ethanol Lvl 403 (H) mg/dL     CBC and differential [[id number]]  (Abnormal) Collected:  04/30/18 0129    Lab Status:  Final result Specimen:  Blood from Arm, Left Updated:  04/30/18 0202     WBC 6 24 Thousand/uL      RBC 4 32 Million/uL      Hemoglobin 10 7 (L) g/dL      Hematocrit 34 3 (L) %      MCV 79 (L) fL      MCH 24 8 (L) pg      MCHC 31 2 (L) g/dL      RDW 13 7 %      MPV 8 4 (L) fL      Platelets 938 (H) Thousands/uL      nRBC 0 /100 WBCs      Neutrophils Relative 48 % Lymphocytes Relative 26 %      Monocytes Relative 12 %      Eosinophils Relative 13 (H) %      Basophils Relative 1 %      Neutrophils Absolute 2 96 Thousands/µL      Lymphocytes Absolute 1 60 Thousands/µL      Monocytes Absolute 0 76 Thousand/µL      Eosinophils Absolute 0 82 (H) Thousand/µL      Basophils Absolute 0 08 Thousands/µL     Protime-INR [[id number]]  (Abnormal) Collected:  04/30/18 0129    Lab Status:  Final result Specimen:  Blood from Arm, Left Updated:  04/30/18 0201     Protime 16 0 (H) seconds      INR 1 27 (H)    APTT [[id number]]  (Abnormal) Collected:  04/30/18 0129    Lab Status:  Final result Specimen:  Blood from Arm, Left Updated:  04/30/18 0201     PTT 38 (H) seconds     Salicylate level [[id number]]  (Abnormal) Collected:  04/30/18 0129    Lab Status:  Final result Specimen:  Blood from Arm, Left Updated:  10/67/87 1649     Salicylate Lvl <3 (L) mg/dL     Acetaminophen level [[id number]]  (Abnormal) Collected:  04/30/18 0129    Lab Status:  Final result Specimen:  Blood from Arm, Left Updated:  04/30/18 0153     Acetaminophen Level <2 (L) ug/mL     Basic metabolic panel [[id number]] Collected:  04/30/18 0129    Lab Status:  Final result Specimen:  Blood from Arm, Left Updated:  04/30/18 0150     Sodium 137 mmol/L      Potassium 4 3 mmol/L      Chloride 102 mmol/L      CO2 30 mmol/L      Anion Gap 5 mmol/L      BUN 7 mg/dL      Creatinine 1 00 mg/dL      Glucose 89 mg/dL      Calcium 9 2 mg/dL      eGFR 114 ml/min/1 73sq m     Narrative:         National Kidney Disease Education Program recommendations are as follows:  GFR calculation is accurate only with a steady state creatinine  Chronic Kidney disease less than 60 ml/min/1 73 sq  meters  Kidney failure less than 15 ml/min/1 73 sq  meters                   No orders to display         Procedures  Procedures      Phone Consults  ED Phone Contact    ED Course  ED Course as of Apr 30 2145 Mon Apr 30, 2018   0241 MEDICAL ALCOHOL: (!) 403 MDM  CritCare Time    Disposition  Final diagnoses:   Alcohol intoxication ([address] )     Time reflects when diagnosis was documented in both MDM as applicable and the Disposition within this note     Time User Action Codes Description Comment    4/30/2018  2:59 AM Rossy Gonzalez Add [F10 [id number]] Alcohol intoxication [facility])       ED Disposition     ED Disposition Condition Comment    Eloped  Case was discussed with sod and the patient's admission status was agreed to be Admission Status: observation status to the service of Dr Anthony Cook     Follow-up Information     Follow up With Specialties Details Why Yanira San MD Internal Medicine Go on 5/9/2018 1:30 pm with Dr Mustapha Mccullough   E  [address] [facility]  [phone]          Discharge Medication List as of 4/30/2018 12:34 PM      CONTINUE these medications which have NOT CHANGED    Details   ARIPiprazole (ABILIFY) 5 mg tablet Take 1 tablet (5 mg total) by mouth daily, Starting Sat 3/24/2018, Print      ferrous sulfate 324 (65 Fe) mg Take 1 tablet (324 mg total) by mouth 2 (two) times a day before meals, Starting Thu 4/12/2018, Print      rivaroxaban (XARELTO) 20 mg tablet Take 1 tablet (20 mg total) by mouth daily with breakfast, Starting Sun 3/11/2018, Print             Outpatient Discharge Orders  Discharge Diet     Activity as tolerated         ED Provider  Attending physically available and evaluated Yennifer Breaker  I managed the patient along with the ED Attending      Electronically Signed by         Olegario Crespo MD  04/30/18 6518

## 2018-04-30 NOTE — ED NOTES
Pt seen running out of ED and down the side walk  Pt removed IV before running out  Dr Sabrina Brock,  Surinder Faith, and CC Elvi Kessler aware  Pt belonging still secured in holding area  SOD A paged, and made aware  SOD A would like police notified    Security aware     Ioana Robertson RN  04/30/18 1865

## 2018-04-30 NOTE — PROGRESS NOTES
[facility] Senior Admission Note   Unit/Bed # @DBLINK (QPR,75615)@ Encounter: [id number]  SOD Team A          [facility] 34 [facility] [id number]       Patient seen and examined  Reviewed H&P per Dr Harini Pandey  Agree with the assessment and plan  Assessment/Plan: Principal Problem:    Acute alcohol intoxication ([facility] )  Active Problems:    Marijuana abuse    Schizophrenia ([facility] )    Iron deficiency anemia     26-year-old male with past medical history significant for schizophrenia, DVT/PE on Xarelto with lupus anticoagulant, iron deficiency anemia presenting with acute intoxication  Patient is very poor historian; he states he was alberta a good time," and drank alcohol and cigarette butt juice along with use of heroin and spice  He has no other complaints at this time and denies any trauma  Patient very tangential throughout interview  Blood alcohol on admission 403  For intoxication, will provide supportive care  A rapid drug screen is pending and will follow up results, and will repeat blood alcohol level in several hours  Continue Xarelto for DVT/PE  Continue all other home medications were balance of other medical conditions  Further history regarding events leading to admission will need to be obtained once patient's acute intoxication has resolved      Disposition:  OBSERVATION  Expected LOS: <2 [address], DO  PGY-3 IM

## 2018-04-30 NOTE — ED ATTENDING ATTESTATION
I, [address], DO, saw and evaluated the patient  I have discussed the patient with the resident/non-physician practitioner and agree with the resident's/non-physician practitioner's findings, Plan of Care, and MDM as documented in the resident's/non-physician practitioner's note, except where noted  All available labs and Radiology studies were reviewed  At this point I agree with the current assessment done in the Emergency Department  I have conducted an independent evaluation of this patient a history and physical is as follows:    48-year-old male presents with recreational drug use  Patient was found walking barefoot her stranger's house  Patient on the front porch drinking cigarette butt juice and admitted to heroin and spice use  Pt very poor historian  Denies trauma an dcurrently denies complaints  on exam - nad, heart reg, no resp distress, no obvious sign of trauma    plan - labs and observe    Critical Care Time  CritCare Time    Procedures

## 2018-04-30 NOTE — DISCHARGE SUMMARY
[facility] CENTRAL Discharge Summary - Medical Carolyn Gudino 34 y o  male MRN: [id number]    [facility] ED Room / Bed: ED 01/ED 01 Encounter: [id number]    BRIEF OVERVIEW    Admitting Provider: Toney Henao MD  Discharge Provider: [address] [place] [address]  Primary Care Physician at Discharge: Zoey Ramirez MD    Discharge To:  Left AMA    Admission Date: 4/30/2018     Discharge Date: No discharge date for patient encounter  Primary Discharge Diagnosis  Principal Problem:    Acute alcohol intoxication ([address] )  Active Problems:    Marijuana abuse    Schizophrenia (HCC)    Iron deficiency anemia  Resolved Problems:    * No resolved hospital problems  *    Consulting Providers   no    Therapeutic Operative Procedures Performed  no    Diagnostic Procedures Performed  No results found  Results from last 7 days  Lab Units 04/30/18  0129   WBC Thousand/uL 6 24   HEMOGLOBIN g/dL 10 7*   HEMATOCRIT % 34 3*   PLATELETS Thousands/uL 497*   NEUTROS PCT % 48   MONOS PCT % 12       Results from last 7 days  Lab Units 04/30/18  0129   SODIUM mmol/L 137   POTASSIUM mmol/L 4 3   CHLORIDE mmol/L 102   CO2 mmol/L 30   BUN mg/dL 7   CREATININE mg/dL 1 00   CALCIUM mg/dL 9 2   GLUCOSE RANDOM mg/dL 89         No results found for: PHOS     Results from last 7 days  Lab Units 04/30/18  0129   INR  1 27*   PTT seconds 38*       0  Lab Value Date/Time   TROPONINI 0 13 (H) 02/17/2018 0705   TROPONINI 0 19 (H) 02/16/2018 1722         ABG:No results found for: PHART, IWT4PHG, PO2ART, XXY4FWC, D8OVTZGP, BEART, SOURCE    Discharge Disposition: Home/Self Care  Discharged With Lines: allegedly removed IV before leaving AMA  Test Results Pending at Discharge: no    Outpatient Follow-Up  F/u with PCP  Follow up with consulting providers  Will need rheum follow up   Also needs colonoscopy for iron deficiency anemia  Active Issues Requiring Follow-up   Abnormal rheum labs, iron deficiency anemia    Code Status: Level 1 - Full Code  Advance Directive and Living Will: <no information>  Power of :    POLST:      Medications   Current Discharge Medication List        Current Discharge Medication List      CONTINUE these medications which have NOT CHANGED    Details   ARIPiprazole (ABILIFY) 5 mg tablet Take 1 tablet (5 mg total) by mouth daily  Qty: 30 tablet, Refills: 1    Associated Diagnoses: Schizophrenia (HCC)      ferrous sulfate 324 (65 Fe) mg Take 1 tablet (324 mg total) by mouth 2 (two) times a day before meals  Qty: 60 tablet, Refills: 0    Associated Diagnoses: Iron deficiency anemia      rivaroxaban (XARELTO) 20 mg tablet Take 1 tablet (20 mg total) by mouth daily with breakfast  Qty: 30 tablet, Refills: 0    Associated Diagnoses: Other pulmonary embolism with acute cor pulmonale, unspecified chronicity ([facility] )           Current Discharge Medication List          Allergies  No Known Allergies  Discharge Diet: regular diet  Activity restrictions: none    DETAILS [address] [facility] Course  HPI as per Dr Niall Alexis Spearing a 34 y  o  male with a past medical history DVT/PE on Xarelto, recent work-up for suspected rheumatologic disorder presenting with acute intoxication  The patient presented to the ED acutely intoxicated  Per ED notes he was brought to the ED by EMS as he was noted to be walking around barefoot drinking "cigarette butt juice" and at the time admitted to heroin use and synthetic marijuana  Ethanol lvl 403, rapid drug screen pending  Further history in ED limited as patient acutely intoxicated, though patient comfortable-appearing smiling articulating clearly  Of note, the patient was recently admitted for work-up for joint-swelling concerning for rheumatologic disorder  On follow-up in clinic 4/17/18 with positive lupus AC, PTT LE mixing study, Sjogren antibodies, ANCA, referred to rheumatology       In ED afebrile HR 80s SBP 130s satting well on RA   Ethanol level 403 as above, coma panel otherwise negative  BMP unremarkable, CBC with hgb 10 7 at baseline  Patient was admitted to [place] for obs, management of intoxication  Given 1L bolus, thiamine and folate  Later in the morning, he was noted to be mumbling and ambulating in the hallway  Security called and he was escorted to a different room for closer monitoring  He was seen crawling on the floor but was helped back into bed  Admitted to polysubstance abuse, and kept stating he wanted a "marijuana card to smoke marijuana legally"  A few hours later, he was noted to have removed his IV and ran out of the ED  Security and [employer]  He is a high risk of re-admission given history of polysubstance abuse and leaving AMA  Physical Exam  General: crawling around on the floor  Appears to have dysphoric mood, visual hallucinations  HEENT: injected conjunctiva  Nc/at  eomi  Cardiovascular: reg rate and rhythm  Pulmonary: clear bilaterally  Abdominal: obese belly, soft, nontender  Extremities: no pitting edema  Skin: scaly skin on the LE bilaterally  Neuro: ao to person place month and year but not date  No nystagmus  No focal deficits  Presenting Problem/History of Present Illness  Principal Problem:    Acute alcohol intoxication ([address] )  Active Problems:    Marijuana abuse    Schizophrenia (HCC)    Iron deficiency anemia  Resolved Problems:    * No resolved hospital problems  *    Acute Alcohol Intoxication  -Patient reportedly consumed alcohol, cigarette butt juice, and marijuana prior to admission  -BAL in the   Coma panel negative  -F/u UDS  Patient likely intoxicated with polysubstance      Rheumatologic Disorder  -Recent hospitalizations for acute L wrist and R knee pain    -Rheum labs notable for positive lupus AC, PTT LE mixing study, Sjogren antibodies, ANCA    -Will need follow up with outpatient rheumatology       History of DVT/PE  -Continue xarelto 20mg po daily     Iron Deficiency Anemia  -Continue ferrous sulfate 325mg po bid  -Will need outpatient workup with colonoscopy      Schizophrenia  -Continue abilify 5mg po daily    Discharge Condition: fair    Discharge  Statement   I spent 30 minutes discharging the patient  This time was spent on the day of discharge  I had direct contact with the patient on the day of discharge  Additional documentation is required if more than 30 minutes were spent on discharge

## 2018-04-30 NOTE — ED NOTES
Patient left without belongings   One bag is labeled and put on zone 5 med room countertop     Anand Simons  04/30/18 1867

## 2018-05-22 ENCOUNTER — DOCUMENTATION (OUTPATIENT)
Dept: OTHER | Facility: HOSPITAL | Age: 33
End: 2018-05-22

## 2018-05-22 NOTE — PROGRESS NOTES
Please make this patient an appointment with Rheumatology next month  Dr Maryann Oconnor is aware and is ok with over booking the appointments  He must be seen

## 2019-10-01 NOTE — ED NOTES
Pt ambulated in room, walked from stretcher to door and back  States he is still in pain but was successful in his attempt        Silvio Franklin  04/09/18 2030 Refill done.  Idalia Cortez M.D.